# Patient Record
Sex: FEMALE | URBAN - METROPOLITAN AREA
[De-identification: names, ages, dates, MRNs, and addresses within clinical notes are randomized per-mention and may not be internally consistent; named-entity substitution may affect disease eponyms.]

---

## 2018-12-17 ENCOUNTER — APPOINTMENT (RX ONLY)
Dept: URBAN - METROPOLITAN AREA CLINIC 361 | Facility: CLINIC | Age: 78
Setting detail: DERMATOLOGY
End: 2018-12-17

## 2018-12-17 DIAGNOSIS — Z71.89 OTHER SPECIFIED COUNSELING: ICD-10-CM

## 2018-12-17 DIAGNOSIS — Z87.2 PERSONAL HISTORY OF DISEASES OF THE SKIN AND SUBCUTANEOUS TISSUE: ICD-10-CM

## 2018-12-17 DIAGNOSIS — L57.8 OTHER SKIN CHANGES DUE TO CHRONIC EXPOSURE TO NONIONIZING RADIATION: ICD-10-CM

## 2018-12-17 DIAGNOSIS — D18.0 HEMANGIOMA: ICD-10-CM

## 2018-12-17 DIAGNOSIS — L82.1 OTHER SEBORRHEIC KERATOSIS: ICD-10-CM

## 2018-12-17 DIAGNOSIS — Z85.828 PERSONAL HISTORY OF OTHER MALIGNANT NEOPLASM OF SKIN: ICD-10-CM

## 2018-12-17 PROBLEM — L57.0 ACTINIC KERATOSIS: Status: ACTIVE | Noted: 2018-12-17

## 2018-12-17 PROBLEM — D18.01 HEMANGIOMA OF SKIN AND SUBCUTANEOUS TISSUE: Status: ACTIVE | Noted: 2018-12-17

## 2018-12-17 PROBLEM — I10 ESSENTIAL (PRIMARY) HYPERTENSION: Status: ACTIVE | Noted: 2018-12-17

## 2018-12-17 PROBLEM — L85.3 XEROSIS CUTIS: Status: ACTIVE | Noted: 2018-12-17

## 2018-12-17 PROBLEM — M12.9 ARTHROPATHY, UNSPECIFIED: Status: ACTIVE | Noted: 2018-12-17

## 2018-12-17 PROBLEM — J30.1 ALLERGIC RHINITIS DUE TO POLLEN: Status: ACTIVE | Noted: 2018-12-17

## 2018-12-17 PROCEDURE — ? OTHER

## 2018-12-17 PROCEDURE — ? COUNSELING

## 2018-12-17 PROCEDURE — ? DEFER

## 2018-12-17 PROCEDURE — 99203 OFFICE O/P NEW LOW 30 MIN: CPT

## 2018-12-17 ASSESSMENT — LOCATION DETAILED DESCRIPTION DERM
LOCATION DETAILED: RIGHT RIB CAGE
LOCATION DETAILED: RIGHT DORSAL FOOT
LOCATION DETAILED: LEFT DISTAL PRETIBIAL REGION
LOCATION DETAILED: RIGHT PROXIMAL POSTERIOR UPPER ARM
LOCATION DETAILED: RIGHT INFERIOR MEDIAL UPPER BACK
LOCATION DETAILED: RIGHT DISTAL PRETIBIAL REGION
LOCATION DETAILED: RIGHT LATERAL EYEBROW
LOCATION DETAILED: RIGHT INFERIOR CENTRAL MALAR CHEEK
LOCATION DETAILED: SUPERIOR THORACIC SPINE
LOCATION DETAILED: LEFT DORSAL FOOT
LOCATION DETAILED: LEFT CENTRAL ZYGOMA
LOCATION DETAILED: EPIGASTRIC SKIN
LOCATION DETAILED: UPPER STERNUM
LOCATION DETAILED: LEFT PROXIMAL POSTERIOR UPPER ARM

## 2018-12-17 ASSESSMENT — LOCATION SIMPLE DESCRIPTION DERM
LOCATION SIMPLE: UPPER BACK
LOCATION SIMPLE: ABDOMEN
LOCATION SIMPLE: RIGHT EYEBROW
LOCATION SIMPLE: CHEST
LOCATION SIMPLE: LEFT PRETIBIAL REGION
LOCATION SIMPLE: RIGHT PRETIBIAL REGION
LOCATION SIMPLE: RIGHT POSTERIOR UPPER ARM
LOCATION SIMPLE: RIGHT CHEEK
LOCATION SIMPLE: RIGHT UPPER BACK
LOCATION SIMPLE: RIGHT FOOT
LOCATION SIMPLE: LEFT ZYGOMA
LOCATION SIMPLE: LEFT FOOT
LOCATION SIMPLE: LEFT POSTERIOR UPPER ARM

## 2018-12-17 ASSESSMENT — LOCATION ZONE DERM
LOCATION ZONE: LEG
LOCATION ZONE: FEET
LOCATION ZONE: ARM
LOCATION ZONE: FACE
LOCATION ZONE: TRUNK

## 2018-12-17 NOTE — PROCEDURE: DEFER
Procedure To Be Performed At Next Visit: Cryotherapy (Cosmetic)
Detail Level: Simple
Scheduling Instructions (Optional): pt will schedule after holidays

## 2020-07-14 ENCOUNTER — HOSPITAL ENCOUNTER (OUTPATIENT)
Dept: HOSPITAL 8 - CFH | Age: 80
Discharge: HOME | End: 2020-07-14
Attending: INTERNAL MEDICINE
Payer: MEDICARE

## 2020-07-14 DIAGNOSIS — I44.7: ICD-10-CM

## 2020-07-14 DIAGNOSIS — I08.2: Primary | ICD-10-CM

## 2020-07-14 PROCEDURE — 93017 CV STRESS TEST TRACING ONLY: CPT

## 2020-07-14 PROCEDURE — 93306 TTE W/DOPPLER COMPLETE: CPT

## 2020-07-14 PROCEDURE — A9502 TC99M TETROFOSMIN: HCPCS

## 2020-07-14 PROCEDURE — 78452 HT MUSCLE IMAGE SPECT MULT: CPT

## 2021-07-06 PROBLEM — M75.100 ROTATOR CUFF TEAR: Status: ACTIVE | Noted: 2021-07-06

## 2022-03-04 ENCOUNTER — PRE-ADMISSION TESTING (OUTPATIENT)
Dept: ADMISSIONS | Facility: MEDICAL CENTER | Age: 82
End: 2022-03-04
Attending: ORTHOPAEDIC SURGERY
Payer: MEDICARE

## 2022-03-04 DIAGNOSIS — Z01.812 PRE-OPERATIVE LABORATORY EXAMINATION: ICD-10-CM

## 2022-03-04 DIAGNOSIS — Z01.810 PRE-OPERATIVE CARDIOVASCULAR EXAMINATION: ICD-10-CM

## 2022-03-04 LAB
ANION GAP SERPL CALC-SCNC: 10 MMOL/L (ref 7–16)
BUN SERPL-MCNC: 19 MG/DL (ref 8–22)
CALCIUM SERPL-MCNC: 9.7 MG/DL (ref 8.4–10.2)
CHLORIDE SERPL-SCNC: 102 MMOL/L (ref 96–112)
CO2 SERPL-SCNC: 25 MMOL/L (ref 20–33)
CREAT SERPL-MCNC: 0.91 MG/DL (ref 0.5–1.4)
EKG IMPRESSION: NORMAL
GLUCOSE SERPL-MCNC: 101 MG/DL (ref 65–99)
POTASSIUM SERPL-SCNC: 4.2 MMOL/L (ref 3.6–5.5)
SODIUM SERPL-SCNC: 137 MMOL/L (ref 135–145)

## 2022-03-04 PROCEDURE — C9803 HOPD COVID-19 SPEC COLLECT: HCPCS

## 2022-03-04 PROCEDURE — U0005 INFEC AGEN DETEC AMPLI PROBE: HCPCS

## 2022-03-04 PROCEDURE — 36415 COLL VENOUS BLD VENIPUNCTURE: CPT

## 2022-03-04 PROCEDURE — 93005 ELECTROCARDIOGRAM TRACING: CPT

## 2022-03-04 PROCEDURE — 93010 ELECTROCARDIOGRAM REPORT: CPT | Performed by: INTERNAL MEDICINE

## 2022-03-04 PROCEDURE — 80048 BASIC METABOLIC PNL TOTAL CA: CPT

## 2022-03-04 PROCEDURE — U0003 INFECTIOUS AGENT DETECTION BY NUCLEIC ACID (DNA OR RNA); SEVERE ACUTE RESPIRATORY SYNDROME CORONAVIRUS 2 (SARS-COV-2) (CORONAVIRUS DISEASE [COVID-19]), AMPLIFIED PROBE TECHNIQUE, MAKING USE OF HIGH THROUGHPUT TECHNOLOGIES AS DESCRIBED BY CMS-2020-01-R: HCPCS

## 2022-03-04 RX ORDER — COVID-19 ANTIGEN TEST
KIT MISCELLANEOUS
COMMUNITY
End: 2022-05-02

## 2022-03-04 NOTE — PREPROCEDURE INSTRUCTIONS
"Preadmit appointment: \" Preparing for your Procedure information\" sheet given to patient with verbal and written instructions. Patient instructed to continue prescribed medications through the day before surgery, instructed to take the following medications the day of surgery per anesthesia protocol:  Prevacid, Sotalol  Pt states, \"no issues with anesthesia\".  Fasting guidelines handout given to Pt with Dr. Bains's NPO 8 hours before surgery, Pt is encouraged to have water or Gatorade, coffee or tea, NO DAIRY up till 4 hours before scheduled surgery.  All Pt's questions and concerns answered.  "

## 2022-03-05 LAB
SARS-COV-2 RNA RESP QL NAA+PROBE: NOTDETECTED
SPECIMEN SOURCE: NORMAL

## 2022-03-05 NOTE — PREPROCEDURE INSTRUCTIONS
Email to Dr De Anda:    Good afternoon Dr De Anda, Pt Joseluis MRN [id number] is having a Shoulder scope with Dr Bains on 3/11.  Hx:  A-Fib, LBBB, HTN,  she saw her cardiologist on 1/17/2022, Dr Rene and he cleared her for surgery, there is a note in “E” reconciliation from [facility].  She is taking ASA 81, Sotalol and Lisinopril from a cardiac standpoint.   Otherwise a fast moving 81 year old!  Anything further?    Stu De Anda reviewed medical records and indicates:    Does the cardiology note address the patient’s LBBB?  If so, nothing further from my standpoint.  Thank you.   Chadwick De Anda M.D.  [facility]    Re-Checked notes:  Yes his notes refer to the LBBB as nothing new  Stu ALEXIS

## 2022-03-11 ENCOUNTER — ANESTHESIA EVENT (OUTPATIENT)
Dept: SURGERY | Facility: MEDICAL CENTER | Age: 82
End: 2022-03-11
Payer: MEDICARE

## 2022-03-11 ENCOUNTER — HOSPITAL ENCOUNTER (OUTPATIENT)
Facility: MEDICAL CENTER | Age: 82
End: 2022-03-11
Attending: ORTHOPAEDIC SURGERY | Admitting: ORTHOPAEDIC SURGERY
Payer: MEDICARE

## 2022-03-11 ENCOUNTER — ANESTHESIA (OUTPATIENT)
Dept: SURGERY | Facility: MEDICAL CENTER | Age: 82
End: 2022-03-11
Payer: MEDICARE

## 2022-03-11 VITALS
OXYGEN SATURATION: 90 % | WEIGHT: 166.01 LBS | TEMPERATURE: 97 F | BODY MASS INDEX: 28.34 KG/M2 | RESPIRATION RATE: 16 BRPM | HEART RATE: 62 BPM | DIASTOLIC BLOOD PRESSURE: 88 MMHG | HEIGHT: 64 IN | SYSTOLIC BLOOD PRESSURE: 149 MMHG

## 2022-03-11 PROCEDURE — C1713 ANCHOR/SCREW BN/BN,TIS/BN: HCPCS | Performed by: ORTHOPAEDIC SURGERY

## 2022-03-11 PROCEDURE — 700101 HCHG RX REV CODE 250: Performed by: ANESTHESIOLOGY

## 2022-03-11 PROCEDURE — 160048 HCHG OR STATISTICAL LEVEL 1-5: Performed by: ORTHOPAEDIC SURGERY

## 2022-03-11 PROCEDURE — 501838 HCHG SUTURE GENERAL: Performed by: ORTHOPAEDIC SURGERY

## 2022-03-11 PROCEDURE — 700105 HCHG RX REV CODE 258: Performed by: ORTHOPAEDIC SURGERY

## 2022-03-11 PROCEDURE — 160046 HCHG PACU - 1ST 60 MINS PHASE II: Performed by: ORTHOPAEDIC SURGERY

## 2022-03-11 PROCEDURE — 64415 NJX AA&/STRD BRCH PLXS IMG: CPT | Performed by: ORTHOPAEDIC SURGERY

## 2022-03-11 PROCEDURE — 700111 HCHG RX REV CODE 636 W/ 250 OVERRIDE (IP): Performed by: ORTHOPAEDIC SURGERY

## 2022-03-11 PROCEDURE — 160029 HCHG SURGERY MINUTES - 1ST 30 MINS LEVEL 4: Performed by: ORTHOPAEDIC SURGERY

## 2022-03-11 PROCEDURE — 160035 HCHG PACU - 1ST 60 MINS PHASE I: Performed by: ORTHOPAEDIC SURGERY

## 2022-03-11 PROCEDURE — 160025 RECOVERY II MINUTES (STATS): Performed by: ORTHOPAEDIC SURGERY

## 2022-03-11 PROCEDURE — 160002 HCHG RECOVERY MINUTES (STAT): Performed by: ORTHOPAEDIC SURGERY

## 2022-03-11 PROCEDURE — C1889 IMPLANT/INSERT DEVICE, NOC: HCPCS | Performed by: ORTHOPAEDIC SURGERY

## 2022-03-11 PROCEDURE — 502000 HCHG MISC OR IMPLANTS RC 0278: Performed by: ORTHOPAEDIC SURGERY

## 2022-03-11 PROCEDURE — 160041 HCHG SURGERY MINUTES - EA ADDL 1 MIN LEVEL 4: Performed by: ORTHOPAEDIC SURGERY

## 2022-03-11 PROCEDURE — 160009 HCHG ANES TIME/MIN: Performed by: ORTHOPAEDIC SURGERY

## 2022-03-11 PROCEDURE — 700105 HCHG RX REV CODE 258: Performed by: ANESTHESIOLOGY

## 2022-03-11 PROCEDURE — 700111 HCHG RX REV CODE 636 W/ 250 OVERRIDE (IP): Performed by: ANESTHESIOLOGY

## 2022-03-11 DEVICE — IMPLANTABLE DEVICE: Type: IMPLANTABLE DEVICE | Status: FUNCTIONAL

## 2022-03-11 RX ORDER — ONDANSETRON 2 MG/ML
INJECTION INTRAMUSCULAR; INTRAVENOUS PRN
Status: DISCONTINUED | OUTPATIENT
Start: 2022-03-11 | End: 2022-03-11 | Stop reason: SURG

## 2022-03-11 RX ORDER — EPINEPHRINE 1 MG/ML(1)
AMPUL (ML) INJECTION
Status: DISCONTINUED | OUTPATIENT
Start: 2022-03-11 | End: 2022-03-11 | Stop reason: HOSPADM

## 2022-03-11 RX ORDER — MEPERIDINE HYDROCHLORIDE 25 MG/ML
12.5 INJECTION INTRAMUSCULAR; INTRAVENOUS; SUBCUTANEOUS
Status: DISCONTINUED | OUTPATIENT
Start: 2022-03-11 | End: 2022-03-11 | Stop reason: HOSPADM

## 2022-03-11 RX ORDER — SODIUM CHLORIDE, SODIUM LACTATE, POTASSIUM CHLORIDE, CALCIUM CHLORIDE 600; 310; 30; 20 MG/100ML; MG/100ML; MG/100ML; MG/100ML
INJECTION, SOLUTION INTRAVENOUS CONTINUOUS
Status: ACTIVE | OUTPATIENT
Start: 2022-03-11 | End: 2022-03-11

## 2022-03-11 RX ORDER — SODIUM CHLORIDE, SODIUM LACTATE, POTASSIUM CHLORIDE, CALCIUM CHLORIDE 600; 310; 30; 20 MG/100ML; MG/100ML; MG/100ML; MG/100ML
INJECTION, SOLUTION INTRAVENOUS CONTINUOUS
Status: DISCONTINUED | OUTPATIENT
Start: 2022-03-11 | End: 2022-03-11 | Stop reason: HOSPADM

## 2022-03-11 RX ORDER — ONDANSETRON 2 MG/ML
4 INJECTION INTRAMUSCULAR; INTRAVENOUS
Status: DISCONTINUED | OUTPATIENT
Start: 2022-03-11 | End: 2022-03-11 | Stop reason: HOSPADM

## 2022-03-11 RX ORDER — OXYCODONE HCL 5 MG/5 ML
10 SOLUTION, ORAL ORAL
Status: DISCONTINUED | OUTPATIENT
Start: 2022-03-11 | End: 2022-03-11 | Stop reason: HOSPADM

## 2022-03-11 RX ORDER — SODIUM CHLORIDE, SODIUM LACTATE, POTASSIUM CHLORIDE, CALCIUM CHLORIDE 600; 310; 30; 20 MG/100ML; MG/100ML; MG/100ML; MG/100ML
INJECTION, SOLUTION INTRAVENOUS CONTINUOUS
Status: DISCONTINUED | OUTPATIENT
Start: 2022-03-11 | End: 2022-03-11

## 2022-03-11 RX ORDER — CEFAZOLIN SODIUM 1 G/3ML
INJECTION, POWDER, FOR SOLUTION INTRAMUSCULAR; INTRAVENOUS PRN
Status: DISCONTINUED | OUTPATIENT
Start: 2022-03-11 | End: 2022-03-11 | Stop reason: SURG

## 2022-03-11 RX ORDER — HYDROMORPHONE HYDROCHLORIDE 1 MG/ML
0.1 INJECTION, SOLUTION INTRAMUSCULAR; INTRAVENOUS; SUBCUTANEOUS
Status: DISCONTINUED | OUTPATIENT
Start: 2022-03-11 | End: 2022-03-11 | Stop reason: HOSPADM

## 2022-03-11 RX ORDER — DEXAMETHASONE SODIUM PHOSPHATE 4 MG/ML
INJECTION, SOLUTION INTRA-ARTICULAR; INTRALESIONAL; INTRAMUSCULAR; INTRAVENOUS; SOFT TISSUE PRN
Status: DISCONTINUED | OUTPATIENT
Start: 2022-03-11 | End: 2022-03-11 | Stop reason: SURG

## 2022-03-11 RX ORDER — ROPIVACAINE HYDROCHLORIDE 5 MG/ML
INJECTION, SOLUTION EPIDURAL; INFILTRATION; PERINEURAL PRN
Status: DISCONTINUED | OUTPATIENT
Start: 2022-03-11 | End: 2022-03-11 | Stop reason: SURG

## 2022-03-11 RX ORDER — OXYCODONE HCL 5 MG/5 ML
5 SOLUTION, ORAL ORAL
Status: DISCONTINUED | OUTPATIENT
Start: 2022-03-11 | End: 2022-03-11 | Stop reason: HOSPADM

## 2022-03-11 RX ORDER — SODIUM CHLORIDE, SODIUM LACTATE, POTASSIUM CHLORIDE, CALCIUM CHLORIDE 600; 310; 30; 20 MG/100ML; MG/100ML; MG/100ML; MG/100ML
INJECTION, SOLUTION INTRAVENOUS
Status: DISCONTINUED | OUTPATIENT
Start: 2022-03-11 | End: 2022-03-11 | Stop reason: SURG

## 2022-03-11 RX ORDER — LIDOCAINE HYDROCHLORIDE 20 MG/ML
INJECTION, SOLUTION EPIDURAL; INFILTRATION; INTRACAUDAL; PERINEURAL PRN
Status: DISCONTINUED | OUTPATIENT
Start: 2022-03-11 | End: 2022-03-11 | Stop reason: SURG

## 2022-03-11 RX ORDER — HYDROMORPHONE HYDROCHLORIDE 1 MG/ML
0.4 INJECTION, SOLUTION INTRAMUSCULAR; INTRAVENOUS; SUBCUTANEOUS
Status: DISCONTINUED | OUTPATIENT
Start: 2022-03-11 | End: 2022-03-11 | Stop reason: HOSPADM

## 2022-03-11 RX ORDER — HYDROMORPHONE HYDROCHLORIDE 1 MG/ML
0.2 INJECTION, SOLUTION INTRAMUSCULAR; INTRAVENOUS; SUBCUTANEOUS
Status: DISCONTINUED | OUTPATIENT
Start: 2022-03-11 | End: 2022-03-11 | Stop reason: HOSPADM

## 2022-03-11 RX ORDER — HALOPERIDOL 5 MG/ML
1 INJECTION INTRAMUSCULAR
Status: DISCONTINUED | OUTPATIENT
Start: 2022-03-11 | End: 2022-03-11 | Stop reason: HOSPADM

## 2022-03-11 RX ADMIN — CEFAZOLIN 2 G: 330 INJECTION, POWDER, FOR SOLUTION INTRAMUSCULAR; INTRAVENOUS at 07:07

## 2022-03-11 RX ADMIN — DEXAMETHASONE SODIUM PHOSPHATE 4 MG: 4 INJECTION, SOLUTION INTRAMUSCULAR; INTRAVENOUS at 07:07

## 2022-03-11 RX ADMIN — LIDOCAINE HYDROCHLORIDE 100 MG: 20 INJECTION, SOLUTION EPIDURAL; INFILTRATION; INTRACAUDAL; PERINEURAL at 07:07

## 2022-03-11 RX ADMIN — SODIUM CHLORIDE, POTASSIUM CHLORIDE, SODIUM LACTATE AND CALCIUM CHLORIDE: 600; 310; 30; 20 INJECTION, SOLUTION INTRAVENOUS at 07:07

## 2022-03-11 RX ADMIN — PROPOFOL 200 MG: 10 INJECTION, EMULSION INTRAVENOUS at 07:07

## 2022-03-11 RX ADMIN — SODIUM CHLORIDE, POTASSIUM CHLORIDE, SODIUM LACTATE AND CALCIUM CHLORIDE: 600; 310; 30; 20 INJECTION, SOLUTION INTRAVENOUS at 06:06

## 2022-03-11 RX ADMIN — EPHEDRINE SULFATE 10 MG: 50 INJECTION INTRAMUSCULAR; INTRAVENOUS; SUBCUTANEOUS at 07:17

## 2022-03-11 RX ADMIN — ROPIVACAINE HYDROCHLORIDE 20 ML: 5 INJECTION, SOLUTION EPIDURAL; INFILTRATION; PERINEURAL at 06:48

## 2022-03-11 RX ADMIN — ONDANSETRON 4 MG: 2 INJECTION INTRAMUSCULAR; INTRAVENOUS at 07:07

## 2022-03-11 NOTE — ANESTHESIA TIME REPORT
Anesthesia Start and Stop Event Times     Date Time Event    3/11/2022 0641 Ready for Procedure     0700 Anesthesia Start     0828 Anesthesia Stop        Responsible Staff  03/11/22    Name Role Begin End    Tobey Gansert, M.D. Anesth 0700 0828        Preop Diagnosis (Free Text):  Pre-op Diagnosis     IMPINGEMENT SYNDROME OF RIGHT SHOULDER        Preop Diagnosis (Codes):    Premium Reason  Non-Premium    Comments:

## 2022-03-11 NOTE — DISCHARGE INSTRUCTIONS
ACTIVITY: Rest and take it easy for the first 24 hours.  A responsible adult is recommended to remain with you during that time.  It is normal to feel sleepy.  We encourage you to not do anything that requires balance, judgment or coordination.    MILD FLU-LIKE SYMPTOMS ARE NORMAL. YOU MAY EXPERIENCE GENERALIZED MUSCLE ACHES, THROAT IRRITATION, HEADACHE AND/OR SOME NAUSEA.    FOR 24 HOURS DO NOT:  Drive, operate machinery or run household appliances.  Drink beer or alcoholic beverages.   Make important decisions or sign legal documents.    SPECIAL INSTRUCTIONS:   Post-Operative Instructions - Shoulder Arthroscopy     Dressing and wound care: Keep your shoulder dressing clean and dry after surgery.  Be aware that some leaking of blood or fluid from your dressing can occur and is normal. You may remove your dressing 3 days after the operation.  Notice that you have a single incision and/or several small incisions that have been closed with sutures. Cover each of these incisions with a light dressing or band-aids.  This keeps the surgical incisions clean, as well as preventing your clothes from spotting with blood or fluid.  Change band-aids or light dressing daily.     Shower / bathing: Keep the shoulder dry for 3 days after your surgery.  Then, you may shower. You may let soap and water run over skin incisions, but do not immerse your shoulder in water.  No swimming pools, hot tubs, or baths are recommended until at least 6 weeks after surgery.     Ice: Apply an ice pack to your shoulder (15 minutes on the shoulder, 15 minutes off the shoulder), as you feel necessary to help with the pain and swelling.   If you have a cold therapy, use liberally as directed.     Sling / Shoulder Immobilizer: The sling should be on except when bathing or doing your exercises.  You may also carefully remove the sling when awake and seated.  Be sure to support your forearm with a pillow so your shoulder remains in a relaxed and  comfortable position.  Please wear the sling while sleeping.     Activity: It is important to move your shoulder, as well as your elbow, wrist, and hand several times daily, starting the day after surgery.  You may do pendulum exercises with your operative arm starting the day after surgery.  Pendulum (dangling [place]) exercises are encouraged 3-5 times daily.  The sling will need to be removed for pendulum exercises.  You may loosen the sling to use your operative arm for light simple activities such as eating, brushing teeth, or writing/typing.  Nothing heavier than a cup of coffee in your hand on the operative side until otherwise recommended.     Pain medication: Take your pain medicine, as needed and prescribed.  Do not drive or operate machinery while taking narcotic pain medication.   You may start or resume anti-inflammatory medication (i.e. ibuprofen, naproxen) anytime after surgery, which should be taken with food to avoid stomach irritation.     Driving: You may drive as soon as you are off narcotic pain medication and feel comfortable behind the wheel.  Loosen or remove your sling, supporting your forearm on a pillow when you drive.  It is important for both hands to have access to the steering wheel for safety.     Aspirin: Take one low dose 81 mg aspirin starting the morning after surgery twice daily - one in the morning and one in the evening - for two weeks following surgery.     Problems: If you are having problems with your shoulder (unexpected pain, excessive bleeding or discharge from your incisions, fevers/chills) do not hesitate to call the office or visit the nearest emergency room for evaluation.     Follow-up: Make sure that you have an appointment 7-14 days following surgery.  Your stitches will be removed, and your procedure/rehabilitation will be discussed at that time.   Physical therapy may be prescribed at that time.     Annette Bains MD [facility]  [phone]      DIET: To avoid nausea, slowly advance diet as tolerated, avoiding spicy or greasy foods for the first day.  Add more substantial food to your diet according to your physician's instructions.   INCREASE FLUIDS AND FIBER TO AVOID CONSTIPATION.      FOLLOW-UP APPOINTMENT:  A follow-up appointment should be arranged with your doctor in office as instructed; call to schedule.    You should CALL YOUR PHYSICIAN if you develop:  Fever greater than 101 degrees F.  Pain not relieved by medication, or persistent nausea or vomiting.  Excessive bleeding (blood soaking through dressing) or unexpected drainage from the wound.  Extreme redness or swelling around the incision site, drainage of pus or foul smelling drainage.  Inability to urinate or empty your bladder within 8 hours.  Problems with breathing or chest pain.    You should call 911 if you develop problems with breathing or chest pain.  If you are unable to contact your doctor or surgical center, you should go to the nearest emergency room or urgent care center.  Physician's telephone #:   Dr Bains [phone]    If any questions arise, call your doctor.  If your doctor is not available, please feel free to call the Surgical Center at [phone].     A registered nurse may call you a few days after your surgery to see how you are doing after your procedure.    MEDICATIONS: Resume taking daily medication.  Take prescribed pain medication with food.  If no medication is prescribed, you may take non-aspirin pain medication if needed.  PAIN MEDICATION CAN BE VERY CONSTIPATING.  Take a stool softener or laxative such as senokot, pericolace, or milk of magnesia if needed.    No oral pain medication given in Recovery    If your physician has prescribed pain medication that includes Acetaminophen (Tylenol), do not take additional Acetaminophen (Tylenol) while taking the prescribed medication.    Depression / Suicide Risk    As you are discharged from  this [facility] facility, it is important to learn how to keep safe from harming yourself.    Recognize the warning signs:  · Abrupt changes in personality, positive or negative- including increase in energy   · Giving away possessions  · Change in eating patterns- significant weight changes-  positive or negative  · Change in sleeping patterns- unable to sleep or sleeping all the time   · Unwillingness or inability to communicate  · Depression  · Unusual sadness, discouragement and loneliness  · Talk of wanting to die  · Neglect of personal appearance   · Rebelliousness- reckless behavior  · Withdrawal from people/activities they love  · Confusion- inability to concentrate     If you or a loved one observes any of these behaviors or has concerns about self-harm, here's what you can do:  · Talk about it- your feelings and reasons for harming yourself  · Remove any means that you might use to hurt yourself (examples: pills, rope, extension cords, firearm)  · Get professional help from the community (Mental Health, Substance Abuse, psychological counseling)  · Do not be alone:Call your Safe Contact- someone whom you trust who will be there for you.  · Call your local CRISIS HOTLINE 822-7679 or 585-352-3026  · Call your local Children's Mobile Crisis Response Team [place] (597) 573-4289 or www.[employer]  · Call the toll free National Suicide Prevention Hotlines   · National Suicide Prevention Lifeline 052-024-XMKY (2272)  · National Hope Line Network 800-SUICIDE (882-8375)          Peripheral Nerve Block Discharge Instructions from Same Day Surgery and Inpatient :    What to Expect - Upper Extremity  · You may experience numbness and weakness in shoulder, arm and hand  on the same side as your surgery  · This is normal. For some people, this may be an unpleasant sensation. Be very careful with your numb limb  · Ask for help when you need it  Shoulder Surgery Side Effects  · In addition to numbness and  "weakness you may experience other symptoms  · Other nerves that are close to those nerves injected can also be affected by local anesthesia  · You may experience a hoarseness in your voice  · Your breathing may feel different  · You may also notice drooping of your eyelid, pupil constriction, and decreased sweating, on the side of your surgery  · All of these side effects are normal and will resolve when the local anesthetic wears off   Prevent Injury  · Protect the limb like a baby  · Beware of exposing your limb to extreme heat or cold or trauma  · The limb may be injured without you noticing because it is numb  · Keep the limb elevated whenever possible  · Do not sleep on the limb  · Change the position of the limb regularly  · Avoid putting pressure on your surgical limb  Pain Control  · The initial block on the day of surgery will make your extremity feel \"numb\"  · Any consecutive injection including prior to discharge from the hospital will make your extremity feel \"numb\"  · You may feel an aching or burning when the local anesthesia starts to wear off  · Take pain pills as prescribed by your surgeon  · Call your surgeon or anesthesiologist if you do not have adequate pain control        How To Use an Incentive Spirometer  An incentive spirometer is a tool that measures how well you are filling your lungs with each breath. Learning to take long, deep breaths using this tool can help you keep your lungs clear and active. This may help to reverse or lessen your chance of developing breathing (pulmonary) problems, especially infection. You may be asked to use a spirometer:  · After a surgery.  · If you have a lung problem or a history of smoking.  · After a long period of time when you have been unable to move or be active.  If the spirometer includes an indicator to show the highest number that you have reached, your health care provider or respiratory therapist will help you set a goal. Keep a list (log) of " your progress as told by your health care provider.  What are the risks?  · Breathing too quickly may cause dizziness or cause you to pass out. Take your time so you do not get dizzy or light-headed.  · If you are in pain, you may need to take pain medicine before doing incentive spirometry. It is harder to take a deep breath if you are having pain.  How to use your incentive spirometer    1. Sit up on the edge of your bed or on a chair.  2. Hold the incentive spirometer so that it is in an upright position.  3. Before you use the spirometer, breathe out normally.  4. Place the mouthpiece in your mouth. Make sure your lips are closed tightly around it.  5. Breathe in slowly and as deeply as you can through your mouth, causing the piston or the ball to rise toward the top of the chamber.  6. Hold your breath for 3-5 seconds, or for as long as possible.  ? If the spirometer includes a  indicator, use this to guide you in breathing. Slow down your breathing if the indicator goes above the marked areas.  7. Remove the mouthpiece from your mouth and breathe out normally. The piston or ball will return to the bottom of the chamber.  8. Rest for a few seconds, then repeat the steps 10 or more times.  ? Take your time and take a few normal breaths between deep breaths so that you do not get dizzy or light-headed.  ? Do this every 1-2 hours when you are awake.  9. If the spirometer includes a goal marker to show the highest number you have reached (best effort), use this as a goal to work toward during each repetition.  10. After each set of 10 deep breaths, cough a few times. This will help to make sure that your lungs are clear.  ? If you have an incision on your chest or abdomen from surgery, place a pillow or a rolled-up towel firmly against the incision when you cough. This can help to reduce pain from coughing.  General tips  · When you become able to get out of bed, walk around often and continue to cough to help  clear your lungs.  · Keep using the incentive spirometer until your health care provider says it is okay to stop using it. If you have been in the hospital, you may be told to keep using the spirometer at home.  Contact a health care provider if:  · You are having difficulty using the spirometer.  · You have trouble using the spirometer as often as instructed.  · Your pain medicine is not giving enough relief for you to use the spirometer as told.  · You have a fever.  · You develop shortness of breath.  Get help right away if:  · You develop a cough with bloody mucus from the lungs (bloody sputum).  · You have fluid or blood coming from an incision site after you cough.  Summary  · An incentive spirometer is a tool that can help you learn to take long, deep breaths to keep your lungs clear and active.  · You may be asked to use a spirometer after a surgery, if you have a lung problem or a history of smoking, or if you have been inactive for a long period of time.  · Use your incentive spirometer as instructed every 1-2 hours while you are awake.  · If you have an incision on your chest or abdomen, place a pillow or a rolled-up towel firmly against your incision when you cough. This will help to reduce pain.  This information is not intended to replace advice given to you by your health care provider. Make sure you discuss any questions you have with your health care provider.  Document Released: 04/29/2008 Document Revised: 01/10/2019 Document Reviewed: 10/31/2018  Elsevier Patient Education © 2020 [employer]

## 2022-03-11 NOTE — ANESTHESIA PROCEDURE NOTES
Peripheral Block    Date/Time: 3/11/2022 6:45 AM  Performed by: Tobey Gansert, M.D.  Authorized by: Tobey Gansert, M.D.     Start Time:  3/11/2022 6:45 AM  End Time:  3/11/2022 6:50 AM  Reason for Block: at surgeon's request and post-op pain management ONLY    patient identified, IV checked, site marked, risks and benefits discussed, surgical consent, monitors and equipment checked, pre-op evaluation and timeout performed    Patient Position:  Supine  Prep: ChloraPrep    Monitoring:  Heart rate, continuous pulse ox and cardiac monitor  Block Region:  Upper Extremity  Upper Extremity - Block Type:  BRACHIAL PLEXUS block, Interscalene approach    Laterality:  Right  Procedures: ultrasound guided  Image captured, interpreted and electronically stored.  Local Infiltration:  Lidocaine  Strength:  1 %  Dose:  3 ml  Block Type:  Single-shot  Needle Length:  50mm  Needle Gauge:  22 G  Needle Localization:  Ultrasound guidance  Injection Assessment:  Negative aspiration for heme, no paresthesia on injection, incremental injection and local visualized surrounding nerve on ultrasound  Evidence of intravascular injection: No     US Guided Interscalene Brachial Plexus Block   US transducer placed on the neck in oblique plane approximately at the level of C6.  Anterior and Middle Scalene (MSM) muscles identified with nerve trunks identified between the muscles.  Needle inserted lateral to probe and advanced under direct visualization through the MSM into a perineural position.  After negative aspiration, LA injected with ease and visualized surrounding the nerve trunks.

## 2022-03-11 NOTE — OR SURGEON
Immediate Post OP Note    PreOp Diagnosis: RIGHT shoulder impingement, RCT      PostOp Diagnosis: SAME with LHB tendon tear      Procedure(s):  RIGHT  DECOMPRESSION, SHOULDER, ARTHROSCOPIC - SUBACROMIAL - Wound Class: Clean  ARTHROSCOPIC LABRAL DEBRIDEMENT - Wound Class: Clean  ARTHROSCOPY, SHOULDER, WITH ROTATOR CUFF REPAIR - Wound Class: Clean  ARTHROPLASTY,BALLOON - Wound Class: Clean    Surgeon(s):  Annette Bains M.D.    Anesthesiologist/Type of Anesthesia:  Anesthesiologist: Tobey Gansert, M.D./General    Surgical Staff:  Circulator: Nikole Romo R.N.  Scrub Person: Haleihg Santillan  First Assist: Gagandeep Castillo [email or web address]    Specimens removed if any:  * No specimens in log *    Estimated Blood Loss: min    Findings: as above    Complications: none    [place]        3/11/2022 8:33 AM Annette Bains M.D.

## 2022-03-11 NOTE — ANESTHESIA POSTPROCEDURE EVALUATION
Patient: Elvi Huggins    Procedure Summary     Date: 03/11/22 Room / Location:  OR  / SURGERY [facility]    Anesthesia Start: 0700 Anesthesia Stop: 0828    Procedures:       DECOMPRESSION, SHOULDER, ARTHROSCOPIC - SUBACROMIAL (Right Shoulder)      ARTHROSCOPIC LABRAL DEBRIDEMENT (Right Shoulder)      ARTHROSCOPY, SHOULDER, WITH ROTATOR CUFF REPAIR (Right Shoulder)      ARTHROPLASTY,BALLOON (Right Shoulder) Diagnosis: (IMPINGEMENT SYNDROME OF RIGHT SHOULDER)    Surgeons: Annette Bains M.D. Responsible Provider: Tobey Gansert, M.D.    Anesthesia Type: general ASA Status: 2          Final Anesthesia Type: general  Last vitals  BP   Blood Pressure : 149/88    Temp   36.1 °C (97 °F)    Pulse   62   Resp   16    SpO2   90 %      Anesthesia Post Evaluation    Patient location during evaluation: PACU  Patient participation: complete - patient participated  Level of consciousness: awake and alert    Airway patency: patent  Anesthetic complications: no  Cardiovascular status: hemodynamically stable  Respiratory status: acceptable  Hydration status: euvolemic    PONV: none          No complications documented.     Nurse Pain Score: 0 (NPRS)

## 2022-03-11 NOTE — OP REPORT
DATE OF SERVICE:  03/11/2022     PREOPERATIVE DIAGNOSES:  Right shoulder impingement syndrome, rotator cuff   tear.     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     PROCEDURES:  Right shoulder arthroscopy, subacromial decompression, labral   debridement, arthroscopic rotator cuff repair including subscapularis tendon,   InSpace balloon arthroplasty.     SURGEON:  Annette Bains MD     ASSISTANT:  Gagandeep Castillo CFA     ANESTHESIOLOGIST:  Tobey Gansert, MD     TYPE OF ANESTHESIA:  General, with preoperative interscalene nerve block.     INTRAVENOUS FLUID:  1 liter crystalloid.     ESTIMATED BLOOD LOSS:  Minimal.     DRAINS:  None.     SPECIMENS:  None.     COMPLICATIONS:  None.     IMPLANTS:  [place] alpha vent anchors x2, ReelX anchor x1.     REASON FOR PROCEDURE:  The patient is an 81-year-old female with a   longstanding history of right shoulder pain.  She was diagnosed with a rotator   cuff tear on MRI.  She had essentially no glenohumeral joint arthritis.  We   decided to proceed with arthroscopy, with rotator cuff repair as well as   balloon arthroplasty due to her anticipated bone and tissue quality.     DESCRIPTION OF PROCEDURE:  The patient was given a right interscalene nerve   block by the anesthesiologist before surgery.  Once back in the operating   room, a breathing tube was placed.  She was given 2 grams of IV Ancef.  She   was then placed in the lateral decubitus position.  Care was taken to pad her   axilla as well as all bony prominences.  The right shoulder was then examined   under anesthesia.  She was noted to have good range of motion, without   instability.  The right upper extremity was then prepped with ChloraPrep and   draped in standard sterile fashion.  It was then placed in the Arthrex   traction device.  Bony landmarks were drawn and a standard  posterior portal   was established.  The arthroscope was then inserted.  An anterosuperior   cannula was placed in the rotator interval, just beneath the biceps tendon   region, although the biceps tendon was noted to be torn and retracted out of   the joint.  There were essentially no degenerative arthritic changes in the   glenohumeral joint.  There was a global labral fraying.  A labral debridement   took place.  There was an upper rolled edge subscapularis tendon tear.  The   decision was made to repair this, especially in light of the anticipated   balloon arthroplasty.  The lesser tuberosity was then abraded.  An 8 mm   cannula was placed.  An inverted horizontal mattress luggage tag suture was   placed around the subscapularis tendon, then the anchor was tapped and the   suture was wheeled in allowing for the subscapularis tendon to be repaired.    Next, the arthroscope was inserted into the subacromial space.  A lateral   portal was established.  There was a moderate degree of thickened, inflamed   bursal tissue.  A limited bursectomy was carried out because of the   anticipated balloon.  The undersurface of the acromion was then abraded and   the 5.5 mm resector was used to remove and smoothed off some bone on the   undersurface of the acromion.  The lateral aspect of the acromioplasty was   completed.  The coracoacromial ligament was left intact.  Next, the rotator   cuff was approached.  There was a mildly retracted rotator cuff tear, with   quite thin tissue.  This was, however repairable.  A 4 anchor repair was   planned.  This was done by placing two medial row Debbi alpha vent anchors.    The Cobra device was used to take anterior and posterior simple passes, with   the tapes placed in an alternating fashion.  The tails of the tape were then   placed into a posterolateral followed by an anterolateral ReelX anchor.  Both   of the ReelX anchors; however, pulled out due to very poor bone quality in  the   lateral tuberosity.  Thus, these 2 lateral row anchors were removed.  The   medial row anchors were left in place.  This left a small defect in the   central portion of the rotator cuff; however, the decision was made not to   place any further anchors in the tuberosity due to very soft bone, placing the   anchors at risk for future pullout.  At this point, the balloon arthroplasty   was performed.  A medium balloon was measured and placed.  Using the   recommended technique, it was over filled then the recommended fluid was   removed leaving the balloon in place.  The  handle was removed.  The   shoulder was then taken through a range of motion.  The balloon appeared   stable in the subacromial space.  All instruments were then removed.  Portals   were closed with 3-0 nylon suture.  Sterile dressing was applied.  All drapes   were removed and the arm was carefully taken out of traction and placed into a   shoulder abduction sling.  The patient was placed supine on a stretcher and   taken to recovery room, in stable condition.        ______________________________  MD NAN RODGERS/JEANINE    DD:  03/11/2022 08:41  DT:  03/11/2022 09:17    Job#:  [id number]

## 2022-03-11 NOTE — OR NURSING
0927: To PACU from OR. OPA in place with 6lpm oxygen via mask. Respirations spontaneous and unlabored. Surgical dressing CDI. Immobilizer in place. Ice pack applied.  Affected extremity capillary refill less than 3 seconds. Radial pulses 2+ equal bilaterally.   0830: Awake. OPA dc'd  0842: Pt awake alert. Denies pain and nausea. Tolerating sips of water  0850: Pt instructed to use incentive spirometer. Able to demonstrated proper use.  Pt achieved max 750  0855: Pt son Ed updated via phone on progress, POC, ETA for discharge  0900: Meets criteria for Stage 2. Pt resting. Denies pain and nausea. Stable on room air. Will transfer patient once Stage 2 RN becomes available  0915: No change  0930: No change  0940: Pt to Stage 2

## 2022-03-11 NOTE — ANESTHESIA PROCEDURE NOTES
Airway    Date/Time: 3/11/2022 7:09 AM  Performed by: Tobey Gansert, M.D.  Authorized by: Tobey Gansert, M.D.     Location:  OR  Urgency:  Elective  Indications for Airway Management:  Anesthesia      Spontaneous Ventilation: absent    Sedation Level:  Deep  Preoxygenated: Yes    Final Airway Type:  Supraglottic airway  Final Supraglottic Airway:  Standard LMA    SGA Size:  3  Number of Attempts at Approach:  1

## 2022-03-11 NOTE — ANESTHESIA PREPROCEDURE EVALUATION
Case: [id number] Date/Time: 03/11/22 0645    Procedures:       DECOMPRESSION, SHOULDER, ARTHROSCOPIC - SUBACROMIAL (Right )      ARTHROSCOPIC LABRAL DEBRIDEMENT      ARTHROSCOPY, SHOULDER, WITH ROTATOR CUFF REPAIR - POSSIBLE      ARTHROPLASTY,BALLOON - POSSIBLE, AND REPAIRS AS INDICATED    Pre-op diagnosis: IMPINGEMENT SYNDROME OF RIGHT SHOULDER    Location: [facility] OR 05 / SURGERY [facility]    Surgeons: Annette Bains M.D.          Relevant Problems   No relevant active problems       Physical Exam    Airway   Mallampati: II  TM distance: >3 FB  Neck ROM: full       Cardiovascular - normal exam  Rhythm: regular  Rate: normal  (-) murmur     Dental - normal exam           Pulmonary - normal exam  Breath sounds clear to auscultation     Abdominal    Neurological - normal exam                 Anesthesia Plan    ASA 2       Plan - general and peripheral nerve block     Peripheral nerve block will be post-op pain control  Airway plan will be LMA          Induction: intravenous    Postoperative Plan: Postoperative administration of opioids is intended.    Pertinent diagnostic labs and testing reviewed    Informed Consent:    Anesthetic plan and risks discussed with patient.    Use of blood products discussed with: patient whom consented to blood products.

## 2022-04-05 NOTE — DOCUMENTATION QUERY
[facility]                                                                       Query Response Note      PATIENT:               PAULA ALBERTS  ACCT #:                  [id number]  MRN:                     [id number]  :                      1940  ADMIT DATE:       3/11/2022 5:20 AM  DISCH DATE:        3/11/2022 10:20 AM  RESPONDING  PROVIDER #:        [id number]           QUERY TEXT:    Could you please clarify if the Rotator Cuff Tears are a component of the Shoulder Impingement Syndrome or if they are separate conditions.    1) RCT component is a component of the Shoulder Impingement Syndrome  2) RCT is a separate condition  3) other Please specify:    The patient's clinical indicators include:  Rotator Cuff Tear and Impingement Syndrome        Query created by: Joana Frazier on 3/30/2022 1:31 PM    RESPONSE TEXT:    RCT is a separate condition          Electronically signed by:  FRANDY MARIN MD 2022 1:45 PM

## 2022-05-02 ENCOUNTER — PRE-ADMISSION TESTING (OUTPATIENT)
Dept: ADMISSIONS | Facility: MEDICAL CENTER | Age: 82
End: 2022-05-02
Attending: ORTHOPAEDIC SURGERY
Payer: MEDICARE

## 2022-05-02 RX ORDER — TRAMADOL HYDROCHLORIDE 50 MG/1
1 TABLET ORAL EVERY 4 HOURS PRN
COMMUNITY
Start: 2022-04-19

## 2022-05-02 RX ORDER — DICYCLOMINE HYDROCHLORIDE 10 MG/1
10 CAPSULE ORAL 2 TIMES DAILY PRN
COMMUNITY
Start: 2022-03-22

## 2022-05-02 RX ORDER — OXYCODONE AND ACETAMINOPHEN 7.5; 325 MG/1; MG/1
1 TABLET ORAL EVERY 4 HOURS PRN
COMMUNITY
Start: 2022-04-28

## 2022-05-02 NOTE — PREPROCEDURE INSTRUCTIONS
Pre-admit appointment completed.  Pt instructed to continue regularly prescribed medications through the day before surgery. Pt instructed to take the following medications the day of surgery with a sip of water, per anesthesia protocol; sotolol, and if needed-bentyl, percocet or ultram, tums    MET's=>4.

## 2022-05-06 ENCOUNTER — ANESTHESIA EVENT (OUTPATIENT)
Dept: SURGERY | Facility: MEDICAL CENTER | Age: 82
End: 2022-05-06
Payer: MEDICARE

## 2022-05-06 ENCOUNTER — HOSPITAL ENCOUNTER (OUTPATIENT)
Facility: MEDICAL CENTER | Age: 82
End: 2022-05-06
Attending: ORTHOPAEDIC SURGERY | Admitting: ORTHOPAEDIC SURGERY
Payer: MEDICARE

## 2022-05-06 ENCOUNTER — ANESTHESIA (OUTPATIENT)
Dept: SURGERY | Facility: MEDICAL CENTER | Age: 82
End: 2022-05-06
Payer: MEDICARE

## 2022-05-06 VITALS
TEMPERATURE: 97.3 F | DIASTOLIC BLOOD PRESSURE: 70 MMHG | SYSTOLIC BLOOD PRESSURE: 126 MMHG | BODY MASS INDEX: 27.48 KG/M2 | OXYGEN SATURATION: 91 % | HEIGHT: 64 IN | WEIGHT: 160.94 LBS | RESPIRATION RATE: 16 BRPM | HEART RATE: 57 BPM

## 2022-05-06 PROCEDURE — A9270 NON-COVERED ITEM OR SERVICE: HCPCS | Performed by: ANESTHESIOLOGY

## 2022-05-06 PROCEDURE — 160041 HCHG SURGERY MINUTES - EA ADDL 1 MIN LEVEL 4: Performed by: ORTHOPAEDIC SURGERY

## 2022-05-06 PROCEDURE — 700111 HCHG RX REV CODE 636 W/ 250 OVERRIDE (IP): Performed by: ANESTHESIOLOGY

## 2022-05-06 PROCEDURE — 160025 RECOVERY II MINUTES (STATS): Performed by: ORTHOPAEDIC SURGERY

## 2022-05-06 PROCEDURE — 700111 HCHG RX REV CODE 636 W/ 250 OVERRIDE (IP)

## 2022-05-06 PROCEDURE — 700101 HCHG RX REV CODE 250: Performed by: ANESTHESIOLOGY

## 2022-05-06 PROCEDURE — 700105 HCHG RX REV CODE 258: Performed by: ORTHOPAEDIC SURGERY

## 2022-05-06 PROCEDURE — 160009 HCHG ANES TIME/MIN: Performed by: ORTHOPAEDIC SURGERY

## 2022-05-06 PROCEDURE — 99100 ANES PT EXTEME AGE<1 YR&>70: CPT | Performed by: ANESTHESIOLOGY

## 2022-05-06 PROCEDURE — 01630 ANES OPN/ARTHR PX SHO JT NOS: CPT | Performed by: ANESTHESIOLOGY

## 2022-05-06 PROCEDURE — 160029 HCHG SURGERY MINUTES - 1ST 30 MINS LEVEL 4: Performed by: ORTHOPAEDIC SURGERY

## 2022-05-06 PROCEDURE — 501838 HCHG SUTURE GENERAL: Performed by: ORTHOPAEDIC SURGERY

## 2022-05-06 PROCEDURE — 700111 HCHG RX REV CODE 636 W/ 250 OVERRIDE (IP): Performed by: ORTHOPAEDIC SURGERY

## 2022-05-06 PROCEDURE — 160035 HCHG PACU - 1ST 60 MINS PHASE I: Performed by: ORTHOPAEDIC SURGERY

## 2022-05-06 PROCEDURE — 160036 HCHG PACU - EA ADDL 30 MINS PHASE I: Performed by: ORTHOPAEDIC SURGERY

## 2022-05-06 PROCEDURE — 700102 HCHG RX REV CODE 250 W/ 637 OVERRIDE(OP): Performed by: ANESTHESIOLOGY

## 2022-05-06 PROCEDURE — 160048 HCHG OR STATISTICAL LEVEL 1-5: Performed by: ORTHOPAEDIC SURGERY

## 2022-05-06 PROCEDURE — 160046 HCHG PACU - 1ST 60 MINS PHASE II: Performed by: ORTHOPAEDIC SURGERY

## 2022-05-06 PROCEDURE — 700101 HCHG RX REV CODE 250: Performed by: ORTHOPAEDIC SURGERY

## 2022-05-06 PROCEDURE — 160002 HCHG RECOVERY MINUTES (STAT): Performed by: ORTHOPAEDIC SURGERY

## 2022-05-06 RX ORDER — MEPERIDINE HYDROCHLORIDE 25 MG/ML
12.5 INJECTION INTRAMUSCULAR; INTRAVENOUS; SUBCUTANEOUS
Status: DISCONTINUED | OUTPATIENT
Start: 2022-05-06 | End: 2022-05-06 | Stop reason: HOSPADM

## 2022-05-06 RX ORDER — BUPIVACAINE HYDROCHLORIDE AND EPINEPHRINE 5; 5 MG/ML; UG/ML
INJECTION, SOLUTION PERINEURAL
Status: DISCONTINUED | OUTPATIENT
Start: 2022-05-06 | End: 2022-05-06 | Stop reason: HOSPADM

## 2022-05-06 RX ORDER — SODIUM CHLORIDE, SODIUM LACTATE, POTASSIUM CHLORIDE, CALCIUM CHLORIDE 600; 310; 30; 20 MG/100ML; MG/100ML; MG/100ML; MG/100ML
INJECTION, SOLUTION INTRAVENOUS CONTINUOUS
Status: DISCONTINUED | OUTPATIENT
Start: 2022-05-06 | End: 2022-05-06 | Stop reason: HOSPADM

## 2022-05-06 RX ORDER — DEXAMETHASONE SODIUM PHOSPHATE 4 MG/ML
INJECTION, SOLUTION INTRA-ARTICULAR; INTRALESIONAL; INTRAMUSCULAR; INTRAVENOUS; SOFT TISSUE PRN
Status: DISCONTINUED | OUTPATIENT
Start: 2022-05-06 | End: 2022-05-06 | Stop reason: SURG

## 2022-05-06 RX ORDER — OXYCODONE HCL 5 MG/5 ML
10 SOLUTION, ORAL ORAL
Status: COMPLETED | OUTPATIENT
Start: 2022-05-06 | End: 2022-05-06

## 2022-05-06 RX ORDER — HYDRALAZINE HYDROCHLORIDE 20 MG/ML
5 INJECTION INTRAMUSCULAR; INTRAVENOUS
Status: DISCONTINUED | OUTPATIENT
Start: 2022-05-06 | End: 2022-05-06 | Stop reason: HOSPADM

## 2022-05-06 RX ORDER — HYDROMORPHONE HYDROCHLORIDE 1 MG/ML
0.1 INJECTION, SOLUTION INTRAMUSCULAR; INTRAVENOUS; SUBCUTANEOUS
Status: DISCONTINUED | OUTPATIENT
Start: 2022-05-06 | End: 2022-05-06 | Stop reason: HOSPADM

## 2022-05-06 RX ORDER — OXYCODONE HCL 5 MG/5 ML
5 SOLUTION, ORAL ORAL
Status: COMPLETED | OUTPATIENT
Start: 2022-05-06 | End: 2022-05-06

## 2022-05-06 RX ORDER — EPINEPHRINE 1 MG/ML(1)
AMPUL (ML) INJECTION
Status: DISCONTINUED | OUTPATIENT
Start: 2022-05-06 | End: 2022-05-06 | Stop reason: HOSPADM

## 2022-05-06 RX ORDER — LIDOCAINE HYDROCHLORIDE 20 MG/ML
INJECTION, SOLUTION EPIDURAL; INFILTRATION; INTRACAUDAL; PERINEURAL PRN
Status: DISCONTINUED | OUTPATIENT
Start: 2022-05-06 | End: 2022-05-06 | Stop reason: SURG

## 2022-05-06 RX ORDER — ACETAMINOPHEN 500 MG
1000 TABLET ORAL EVERY 6 HOURS PRN
Status: DISCONTINUED | OUTPATIENT
Start: 2022-05-06 | End: 2022-05-06 | Stop reason: HOSPADM

## 2022-05-06 RX ORDER — LIDOCAINE HYDROCHLORIDE 10 MG/ML
INJECTION, SOLUTION EPIDURAL; INFILTRATION; INTRACAUDAL; PERINEURAL
Status: COMPLETED
Start: 2022-05-06 | End: 2022-05-06

## 2022-05-06 RX ORDER — CEFAZOLIN SODIUM 1 G/3ML
INJECTION, POWDER, FOR SOLUTION INTRAMUSCULAR; INTRAVENOUS PRN
Status: DISCONTINUED | OUTPATIENT
Start: 2022-05-06 | End: 2022-05-06 | Stop reason: SURG

## 2022-05-06 RX ORDER — ONDANSETRON 2 MG/ML
4 INJECTION INTRAMUSCULAR; INTRAVENOUS
Status: DISCONTINUED | OUTPATIENT
Start: 2022-05-06 | End: 2022-05-06 | Stop reason: HOSPADM

## 2022-05-06 RX ORDER — KETOROLAC TROMETHAMINE 30 MG/ML
INJECTION, SOLUTION INTRAMUSCULAR; INTRAVENOUS PRN
Status: DISCONTINUED | OUTPATIENT
Start: 2022-05-06 | End: 2022-05-06 | Stop reason: SURG

## 2022-05-06 RX ORDER — HYDROMORPHONE HYDROCHLORIDE 1 MG/ML
0.2 INJECTION, SOLUTION INTRAMUSCULAR; INTRAVENOUS; SUBCUTANEOUS
Status: DISCONTINUED | OUTPATIENT
Start: 2022-05-06 | End: 2022-05-06 | Stop reason: HOSPADM

## 2022-05-06 RX ORDER — MIDAZOLAM HYDROCHLORIDE 1 MG/ML
INJECTION INTRAMUSCULAR; INTRAVENOUS PRN
Status: DISCONTINUED | OUTPATIENT
Start: 2022-05-06 | End: 2022-05-06 | Stop reason: SURG

## 2022-05-06 RX ORDER — DIPHENHYDRAMINE HYDROCHLORIDE 50 MG/ML
6.25 INJECTION INTRAMUSCULAR; INTRAVENOUS
Status: DISCONTINUED | OUTPATIENT
Start: 2022-05-06 | End: 2022-05-06 | Stop reason: HOSPADM

## 2022-05-06 RX ORDER — HYDROMORPHONE HYDROCHLORIDE 1 MG/ML
0.4 INJECTION, SOLUTION INTRAMUSCULAR; INTRAVENOUS; SUBCUTANEOUS
Status: DISCONTINUED | OUTPATIENT
Start: 2022-05-06 | End: 2022-05-06 | Stop reason: HOSPADM

## 2022-05-06 RX ORDER — ONDANSETRON 2 MG/ML
INJECTION INTRAMUSCULAR; INTRAVENOUS PRN
Status: DISCONTINUED | OUTPATIENT
Start: 2022-05-06 | End: 2022-05-06 | Stop reason: SURG

## 2022-05-06 RX ORDER — HALOPERIDOL 5 MG/ML
1 INJECTION INTRAMUSCULAR
Status: DISCONTINUED | OUTPATIENT
Start: 2022-05-06 | End: 2022-05-06 | Stop reason: HOSPADM

## 2022-05-06 RX ADMIN — LIDOCAINE HYDROCHLORIDE 50 MG: 20 INJECTION, SOLUTION EPIDURAL; INFILTRATION; INTRACAUDAL; PERINEURAL at 13:52

## 2022-05-06 RX ADMIN — EPHEDRINE SULFATE 10 MG: 50 INJECTION INTRAMUSCULAR; INTRAVENOUS; SUBCUTANEOUS at 14:01

## 2022-05-06 RX ADMIN — KETOROLAC TROMETHAMINE 15 MG: 30 INJECTION, SOLUTION INTRAMUSCULAR at 14:17

## 2022-05-06 RX ADMIN — PROPOFOL 150 MG: 10 INJECTION, EMULSION INTRAVENOUS at 13:52

## 2022-05-06 RX ADMIN — FENTANYL CITRATE 25 MCG: 50 INJECTION, SOLUTION INTRAMUSCULAR; INTRAVENOUS at 15:13

## 2022-05-06 RX ADMIN — DEXAMETHASONE SODIUM PHOSPHATE 8 MG: 4 INJECTION, SOLUTION INTRAMUSCULAR; INTRAVENOUS at 13:56

## 2022-05-06 RX ADMIN — ACETAMINOPHEN 1000 MG: 500 TABLET, FILM COATED ORAL at 14:46

## 2022-05-06 RX ADMIN — ONDANSETRON 4 MG: 2 INJECTION INTRAMUSCULAR; INTRAVENOUS at 14:12

## 2022-05-06 RX ADMIN — CEFAZOLIN 2 G: 330 INJECTION, POWDER, FOR SOLUTION INTRAMUSCULAR; INTRAVENOUS at 13:52

## 2022-05-06 RX ADMIN — FENTANYL CITRATE 50 MCG: 50 INJECTION, SOLUTION INTRAMUSCULAR; INTRAVENOUS at 14:07

## 2022-05-06 RX ADMIN — FENTANYL CITRATE 50 MCG: 50 INJECTION, SOLUTION INTRAMUSCULAR; INTRAVENOUS at 13:52

## 2022-05-06 RX ADMIN — OXYCODONE HYDROCHLORIDE 5 MG: 5 SOLUTION ORAL at 14:44

## 2022-05-06 RX ADMIN — SODIUM CHLORIDE, POTASSIUM CHLORIDE, SODIUM LACTATE AND CALCIUM CHLORIDE: 600; 310; 30; 20 INJECTION, SOLUTION INTRAVENOUS at 13:44

## 2022-05-06 RX ADMIN — LIDOCAINE HYDROCHLORIDE 0.5 ML: 10 INJECTION, SOLUTION EPIDURAL; INFILTRATION; INTRACAUDAL; PERINEURAL at 13:44

## 2022-05-06 RX ADMIN — FENTANYL CITRATE 25 MCG: 50 INJECTION, SOLUTION INTRAMUSCULAR; INTRAVENOUS at 14:45

## 2022-05-06 RX ADMIN — MIDAZOLAM HYDROCHLORIDE 1 MG: 1 INJECTION, SOLUTION INTRAMUSCULAR; INTRAVENOUS at 13:52

## 2022-05-06 ASSESSMENT — PAIN DESCRIPTION - PAIN TYPE
TYPE: SURGICAL PAIN
TYPE: SURGICAL PAIN
TYPE: ACUTE PAIN
TYPE: SURGICAL PAIN

## 2022-05-06 NOTE — ANESTHESIA TIME REPORT
Anesthesia Start and Stop Event Times     Date Time Event    5/6/2022 1304 Ready for Procedure     1348 Anesthesia Start     1425 Anesthesia Stop        Responsible Staff  05/06/22    Name Role Begin End    Escobar Saldivar M.D. Anesth 1348 1427        Overtime Reason:  no overtime (within assigned shift)    Comments:

## 2022-05-06 NOTE — OR NURSING
1422: Patient arrived to PACU from OR via [place]. Report received from anesthesia and RN. BP 80/47, all other VS WNL on 6 L mask with OPA in place. Dressing to right shoulder clean, dry and intact. Sling in place. Cold pack applied. LR infusing.    1428: BP improved, patient opens eyes to voice and quickly falls back asleep. VSS    1434: OPA removed.Patient more alert. Tolerating water at this time. Denies nausea.    1450: Patient placed on room air. Patient still c/o pain to right shoulder rating 6/10. Not at a tolerable level per patient. See MAR.     1500: Patients son updated. Continuing to work on pain control.     1535: Patient states pain is at a tolerable level. Declines further pain medication. Denies nausea. VSS on room air.     1538: Patient meets criteria to transfer to stage 2. Report called to Corrine LORENZ and patients transported to stage 2 via [facility] by CNA.

## 2022-05-06 NOTE — ANESTHESIA PREPROCEDURE EVALUATION
Case: [id number] Date/Time: 05/06/22 1445    Procedures:       ARTHROSCOPY, SHOULDER, WITH EXTENSIVE DEBRIDEMENT - LABRAL (Right )      REMOVAL, HARDWARE - LOOSE ANCHOR AND REPAIRS AS INDICATED    Pre-op diagnosis: IMPINGEMENT SYNDROME OF RIGHT SHOULDER    Location: [facility] OR 05 / SURGERY [facility]    Surgeons: Annette Bains M.D.          Relevant Problems   No relevant active problems     Past Medical History:   Diagnosis Date   • Abnormal EKG 2020    LBBB-had stress test done to evaluate   • Anemia    • Arrhythmia     A-Fib. Follows with Dr Jeremy Rene ([facility]).   • Arthritis     fingers   • Dry cough     5/2/22-RESOLVED   • Elevated hemoglobin A1c 11/04/2021    5.8   • Heart burn     tums prn   • History of IBS    • Hypertension    • Precancerous skin lesion     Moles excised at office.   • Right shoulder pain    • Urinary incontinence     Stress incontinence         Physical Exam    Airway   Mallampati: II  TM distance: >3 FB  Neck ROM: full       Cardiovascular - normal exam  Rhythm: regular  Rate: normal  (-) murmur     Dental - normal exam           Pulmonary - normal exam  Breath sounds clear to auscultation     Abdominal    Neurological - normal exam                 Anesthesia Plan    ASA 2       Plan - general       Airway plan will be LMA    (Discussed with pt and surgeon that block likely not necessary. Pt can request in PACU if having significant pain)      Induction: intravenous    Postoperative Plan: Postoperative administration of opioids is intended.    Pertinent diagnostic labs and testing reviewed    Informed Consent:    Anesthetic plan and risks discussed with patient.

## 2022-05-06 NOTE — OR NURSING
1538: Pt arrived to stage 2 via [facility] from PACU.  Pt dressed and sitting up in chair.  Pt has no complaints.    1558:  Son at chairside.    1610: D/Yves to care of son post uneventful stay in PACU 2.

## 2022-05-06 NOTE — ANESTHESIA PROCEDURE NOTES
Airway    Date/Time: 5/6/2022 1:52 PM  Performed by: Escobar Saldivar M.D.  Authorized by: Escobar Saldivar M.D.     Location:  OR  Urgency:  Elective  Difficult Airway: No    Indications for Airway Management:  Anesthesia      Spontaneous Ventilation: absent    Sedation Level:  Deep  Preoxygenated: Yes    Mask Difficulty Assessment:  0 - not attempted  Final Airway Type:  Supraglottic airway  Final Supraglottic Airway:  Standard LMA    SGA Size:  3  Number of Attempts at Approach:  1

## 2022-05-06 NOTE — OP REPORT
DATE OF SERVICE:  05/06/2022     PREOPERATIVE DIAGNOSIS:  Right shoulder loose anchor status post rotator cuff   repair and balloon arthroplasty.      POSTOPERATIVE DIAGNOSIS:  Right shoulder loose anchor status post rotator cuff   repair and balloon arthroplasty.      PROCEDURES:  Right shoulder arthroscopy, removal loose anchor, rotator cuff   debridement.     SURGEON:  Annette Bains MD     ASSISTANT:  Gagandeep Castillo CFA     ANESTHESIOLOGIST:  Escobar Saldivar MD     TYPE OF ANESTHESIA:  General.     INTRAVENOUS FLUID:  1 liter crystalloid.     ESTIMATED BLOOD LOSS:  Minimal.     DRAINS:  None.     SPECIMENS:  None.     COMPLICATIONS:  None.     IMPLANTS:  None.     EXPLANTS: Debbi ReelX anchor x1.     REASON FOR PROCEDURE:  The patient is an 81-year-old female who underwent a   right shoulder arthroscopy with a rotator cuff repair and balloon arthroplasty   approximately 7 weeks ago.  She sustained a fall about 3 weeks ago and since   then has had an increased amount of pain.  Plain x-rays demonstrated loose   Debbi ReelX anchor.  We decided to proceed with removal and inspection of   the prior repair.     DESCRIPTION OF PROCEDURE:  The patient was taken to the operating room.  A   breathing tube was placed.  She was then placed in the lateral decubitus   position.  Care was taken to pad her axilla as well as all bony prominences.    The right shoulder was examined under anesthesia.  She was noted to have   slightly limited range of motion.  A gentle manipulation was performed to gain   her full range of motion.  The right upper extremity was then prepped with   ChloraPrep and draped in standard sterile fashion.  It was then placed in the   Arthrex traction device.  Bony landmarks were drawn and a standard posterior   portal was established.  The arthroscope was then inserted into the   glenohumeral joint.  An anterosuperior cannula was placed in the rotator   interval, just beneath the biceps tendon.  A probe  was then inserted.  An   anterosuperior portal was established in the rotator interval region.  A   [place] ReelX anchor was noted loose and out of the bone completely.    Expansion of the anchor was appropriate with the appropriate amount of suture,   but the anchor had simply pulled out of the bone.  An arthroscopic flush   cutter was used to remove the sutures and a [place] was then used to carefully   remove the anchor.  The anchor was removed.  The subscapularis tendon had not   healed the upper rolled edge.  However, there was not a large retracted   full-thickness tear.  Its edges were debrided.  The glenohumeral joint   cartilage surfaces demonstrated slight softening, but no significant arthritic   change.  The arthroscope was then carefully inserted into the lateral aspect   of the subacromial space.  There was a significant bursal scarring.  The   previously placed balloon was not easily visible.  In order to eliminate the   risk of injuring or popping the balloon, the arthroscope was simply removed.    All portals were closed with 3-0 nylon suture.  A sterile dressing was   applied.  A 0.5% Marcaine with epinephrine was then placed in the subcutaneous   tissue around the area of the portals.  Sterile dressing was applied and all   drapes were removed.  The arm was carefully placed into a shoulder abduction   sling.  The patient was placed supine on a stretcher and taken to recovery   room, in stable condition.        ______________________________  MD NAN RODGERS/JEANINE    DD:  05/06/2022 14:32  DT:  05/06/2022 15:49    Job#:  [id number]

## 2022-05-06 NOTE — ANESTHESIA POSTPROCEDURE EVALUATION
Patient: Elvi Huggins    Procedure Summary     Date: 05/06/22 Room / Location:  OR 05 / SURGERY [facility]    Anesthesia Start: 1348 Anesthesia Stop: 1425    Procedures:       ARTHROSCOPY, SHOULDER, DEBRIDEMENT (Right Shoulder)      REMOVAL, HARDWARE - LOOSE ANCHOR (Right Shoulder) Diagnosis: (LOOSE ANCHOR)    Surgeons: Annette Bains M.D. Responsible Provider: Escobar Saldivar M.D.    Anesthesia Type: general ASA Status: 2          Final Anesthesia Type: general  Last vitals  BP   Blood Pressure : 142/70    Temp   36.4 °C (97.5 °F)    Pulse   (!) 54   Resp   14    SpO2   91 %      Anesthesia Post Evaluation    Patient location during evaluation: PACU  Patient participation: complete - patient participated  Level of consciousness: awake and alert    Airway patency: patent  Anesthetic complications: no  Cardiovascular status: hemodynamically stable  Respiratory status: acceptable  Hydration status: euvolemic    PONV: none          No complications documented.     Nurse Pain Score: 5 (NPRS)

## 2022-05-06 NOTE — OR NURSING
Patient allergies and NPO status verified, home medication reconciliation completed and belongings secured. Surgical site verified with patient. Patient verbalizes understanding of pain scale, expected course of stay and plan of care; patient and family state verbal understanding at this time. IV access established. Sequentials/teds in place as ordered.     Pt sent to OR before noting multimodal orders in pre-op.

## 2022-05-06 NOTE — OR SURGEON
Immediate Post OP Note    PreOp Diagnosis: Right shoulder loose anchor s/p RCR and balloon arthroplasty      PostOp Diagnosis: SAME       Procedure(s):  RIGHT  ARTHROSCOPY, SHOULDER, DEBRIDEMENT - Wound Class: Clean  REMOVAL, HARDWARE - LOOSE ANCHOR - Wound Class: Clean    Surgeon(s):  Annette Bains M.D.    Anesthesiologist/Type of Anesthesia:  Anesthesiologist: Escobar Saldivar M.D./General    Surgical Staff:  Assistant: Gagandeep Castillo C.[email or web address]  Circulator: Ivan Mclean R.N.  Scrub Person: Haleigh Santillan    Specimens removed if any:  * No specimens in log *    Estimated Blood Loss: min    Findings: as above    Complications: none    Explants - One [place] REELX anchor        5/6/2022 2:26 PM Annette Bains M.D.

## 2022-05-06 NOTE — DISCHARGE INSTRUCTIONS
ACTIVITY: Rest and take it easy for the first 24 hours.  A responsible adult is recommended to remain with you during that time.  It is normal to feel sleepy.  We encourage you to not do anything that requires balance, judgment or coordination.    MILD FLU-LIKE SYMPTOMS ARE NORMAL. YOU MAY EXPERIENCE GENERALIZED MUSCLE ACHES, THROAT IRRITATION, HEADACHE AND/OR SOME NAUSEA.    FOR 24 HOURS DO NOT:  Drive, operate machinery or run household appliances.  Drink beer or alcoholic beverages.   Make important decisions or sign legal documents.    SPECIAL INSTRUCTIONS: Post-Operative Instructions - Shoulder Arthroscopy   Dressing and wound care: Keep your shoulder dressing clean and dry after surgery.  Be aware that some leaking of blood or fluid from your dressing can occur and is normal. You may remove your dressing 3 days after the operation.  Notice that you have a single incision and/or several small incisions that have been closed with sutures. Cover each of these incisions with a light dressing or band-aids.  This keeps the surgical incisions clean, as well as preventing your clothes from spotting with blood or fluid.  Change band-aids or light dressing daily.   Shower / bathing: Keep the shoulder dry for 3 days after your surgery.  Then, you may shower. You may let soap and water run over skin incisions, but do not immerse your shoulder in water.  No swimming pools, hot tubs, or baths are recommended until at least 6 weeks after surgery.  Ice: Apply an ice pack to your shoulder (15 minutes on the shoulder, 15 minutes off the shoulder), as you feel necessary to help with the pain and swelling.   If you have a cold therapy, use liberally as directed. Sling / Shoulder Immobilizer: YOU MAY REMOVE THE SLING AT ANY TIME AND DO LIGHT DAILY ACTIVITIES AS YOUR PAIN LEVEL ALLOWS.  NO SPECIFIC RESTRICTIONS.  Activity: It is important to move your shoulder, as well as your elbow, wrist, and hand several times daily, starting  the day after surgery.  You may do pendulum exercises with your operative arm starting the day after surgery.  Pendulum (dangling [place]) exercises are encouraged 3-5 times daily.   Pain medication: Take your pain medicine, as needed and prescribed.  Do not drive or operate machinery while taking narcotic pain medication.   You may start or resume anti-inflammatory medication (i.e. ibuprofen, naproxen) anytime after surgery, which should be taken with food to avoid stomach irritation. Driving: You may drive as soon as you are off narcotic pain medication and feel comfortable behind the wheel.  Loosen or remove your sling, supporting your forearm on a pillow when you drive.  It is important for both hands to have access to the steering wheel for safety.  Problems: If you are having problems with your shoulder (unexpected pain, excessive bleeding or discharge from your incisions, fevers/chills) do not hesitate to call the office or visit the nearest emergency room for evaluation.  Follow-up: Make sure that you have an appointment 7-14 days following surgery.  Your stitches will be removed, and your procedure/rehabilitation will be discussed at that time.   Physical therapy may be prescribed at that time. Annette Bains MD [facility] [phone]    DIET: To avoid nausea, slowly advance diet as tolerated, avoiding spicy or greasy foods for the first day.  Add more substantial food to your diet according to your physician's instructions.  Babies can be fed formula or breast milk as soon as they are hungry.  INCREASE FLUIDS AND FIBER TO AVOID CONSTIPATION.        FOLLOW-UP APPOINTMENT:  A follow-up appointment should be arranged with your doctor; call to schedule.    You should CALL YOUR PHYSICIAN if you develop:  Fever greater than 101 degrees F.  Pain not relieved by medication, or persistent nausea or vomiting.  Excessive bleeding (blood soaking through dressing) or unexpected drainage from the  wound.  Extreme redness or swelling around the incision site, drainage of pus or foul smelling drainage.  Inability to urinate or empty your bladder within 8 hours.  Problems with breathing or chest pain.    You should call 911 if you develop problems with breathing or chest pain.  If you are unable to contact your doctor or surgical center, you should go to the nearest emergency room or urgent care center.  Physician's telephone #: [phone]    If any questions arise, call your doctor.  If your doctor is not available, please feel free to call the Surgical Center at [phone].     A registered nurse may call you a few days after your surgery to see how you are doing after your procedure.    MEDICATIONS: Resume taking daily medication.  Take prescribed pain medication with food.  If no medication is prescribed, you may take non-aspirin pain medication if needed.  PAIN MEDICATION CAN BE VERY CONSTIPATING.  Take a stool softener or laxative such as senokot, pericolace, or milk of magnesia if needed.      Last pain medication given at 2:45 PM (5 mg oxycodone and 1000 mg tylenol).    If your physician has prescribed pain medication that includes Acetaminophen (Tylenol), do not take additional Acetaminophen (Tylenol) while taking the prescribed medication.    Depression / Suicide Risk    As you are discharged from this [facility] facility, it is important to learn how to keep safe from harming yourself.    Recognize the warning signs:  · Abrupt changes in personality, positive or negative- including increase in energy   · Giving away possessions  · Change in eating patterns- significant weight changes-  positive or negative  · Change in sleeping patterns- unable to sleep or sleeping all the time   · Unwillingness or inability to communicate  · Depression  · Unusual sadness, discouragement and loneliness  · Talk of wanting to die  · Neglect of personal appearance   · Rebelliousness- reckless  behavior  · Withdrawal from people/activities they love  · Confusion- inability to concentrate     If you or a loved one observes any of these behaviors or has concerns about self-harm, here's what you can do:  · Talk about it- your feelings and reasons for harming yourself  · Remove any means that you might use to hurt yourself (examples: pills, rope, extension cords, firearm)  · Get professional help from the community (Mental Health, Substance Abuse, psychological counseling)  · Do not be alone:Call your Safe Contact- someone whom you trust who will be there for you.  · Call your local CRISIS HOTLINE 826-2600 or 191-899-6860  · Call your local Children's Mobile Crisis Response Team [place] (543) 019-9805 or www.Pley  · Call the toll free National Suicide Prevention Hotlines   · National Suicide Prevention Lifeline 168-838-NUVP (5930)  · National Hope Line Network 800-SUICIDE (454-1328)

## 2022-11-09 ENCOUNTER — PATIENT MESSAGE (OUTPATIENT)
Dept: HEALTH INFORMATION MANAGEMENT | Facility: OTHER | Age: 82
End: 2022-11-09

## 2023-11-29 ENCOUNTER — PATIENT MESSAGE (OUTPATIENT)
Dept: HEALTH INFORMATION MANAGEMENT | Facility: OTHER | Age: 83
End: 2023-11-29

## 2024-05-30 ENCOUNTER — HOSPITAL ENCOUNTER (OUTPATIENT)
Dept: RADIOLOGY | Facility: MEDICAL CENTER | Age: 84
End: 2024-05-30
Attending: NURSE PRACTITIONER
Payer: MEDICARE

## 2024-05-30 DIAGNOSIS — J01.91 ACUTE RECURRENT SINUSITIS, UNSPECIFIED LOCATION: ICD-10-CM

## 2024-08-05 ASSESSMENT — ENCOUNTER SYMPTOMS
NIGHT SWEATS: 0
IRREGULAR HEARTBEAT: 0
PALPITATIONS: 0
COUGH: 0
FOCAL WEAKNESS: 0
ABDOMINAL PAIN: 0
WHEEZING: 0
WEAKNESS: 0
NEAR-SYNCOPE: 0
DYSPNEA ON EXERTION: 0
FEVER: 0
PND: 0
SYNCOPE: 0
DIZZINESS: 0
NAUSEA: 0
VOMITING: 0
ORTHOPNEA: 0
SHORTNESS OF BREATH: 0
DIARRHEA: 0

## 2024-08-05 NOTE — PROGRESS NOTES
Cardiology Initial Consultation Note    Date of note:    8/6/2024    Primary Care Provider: Quoc Sánchez M.D.  Referring Provider: Quoc Sánchez M.D.    Patient Name: Elvi Huggins   YOB: 1940  MRN:              [id number]    Chief Complaint: Paroxysmal AF and pre-op cardiovascular exam    History of Present Illness: Ms. Elvi Huggins is an 83 y.o. female whose current medical problems include paroxysmal atrial fibrillation, hypertension, and left bundle branch block who is here for cardiac consultation for paroxysmal AF and pre-op cardiovascular exam.    The patient previously followed at [facility], last seen by Dr. Rene on 9/27/2023.     The patient presents today to establish care. The patient presents with her brother. The patient can go up two flights of stairs without any chest pain or shortness of breath.  She does report leg pain.  She denies any orthopnea, PND, or leg swelling.  No palpitations.  No syncope or presyncopal episodes.      Cardiovascular Risk Factors:  1. Smoking status: Never smoker  2. Type II Diabetes Mellitus: None  Lab Results   Component Value Date/Time    HBA1C 5.6 05/04/2022 10:46 AM    HBA1C 5.8 (H) 11/04/2021 06:34 AM     3. Hypertension: On medication  4. Dyslipidemia: None/not recently checked  5. Family history of early Coronary Artery Disease in a first degree relative (Male less than 55 years of age; Female less than 65 years of age): None  6.  Obesity and/or Metabolic Syndrome: Body mass index is 31.24 kg/m².  7. Sedentary lifestyle: Not sedentary    Review of Systems   Constitutional: Negative for fever, malaise/fatigue and night sweats.   Cardiovascular:  Negative for chest pain, dyspnea on exertion, irregular heartbeat, leg swelling, near-syncope, orthopnea, palpitations, paroxysmal nocturnal dyspnea and syncope.   Respiratory:  Negative for cough, shortness of breath and wheezing.    Gastrointestinal:  Negative for abdominal pain,  "diarrhea, nausea and vomiting.   Neurological:  Negative for dizziness, focal weakness and weakness.       All other systems reviewed and are negative.     Current Outpatient Medications   Medication Sig Dispense Refill    fluticasone (FLONASE) 50 MCG/ACT nasal spray 1 Spray every day.      folic acid (FOLVITE) 1 MG Tab Take 1 mg by mouth every day.      Multiple Vitamin (MULTIVITAMIN) capsule Take  by mouth.      dicyclomine (BENTYL) 10 MG Cap Take 10 mg by mouth 2 times a day as needed.      Calcium Carbonate Antacid (TUMS PO) Take 1-2 Tablets by mouth as needed.      aspirin 81 MG EC tablet Take 81 mg by mouth 2 times a day.      Calcium Carb-Cholecalciferol (CALCIUM 1000 + D) 1000-800 MG-UNIT Tab Take 1 Tablet by mouth every day.      sotalol (BETAPACE) 120 MG tablet 1/2 TAB TWICE A DAY      lisinopril (PRINIVIL) 20 MG Tab Take 20 mg by mouth every evening.       No current facility-administered medications for this visit.         Allergies   Allergen Reactions    Codeine Nausea    Levofloxacin Hives and Itching     Immediately itches     Physical Exam:  Ambulatory Vitals  /68 (BP Location: Left arm, Patient Position: Sitting, BP Cuff Size: Adult)   Pulse 73   Resp 16   Ht 1.626 m (5' 4\")   Wt 82.6 kg (182 lb)   SpO2 95%    Oxygen Therapy:  Pulse Oximetry: 95 %  BP Readings from Last 4 Encounters:   08/06/24 128/68   05/06/22 126/70   03/11/22 149/88       Weight/BMI: Body mass index is 31.24 kg/m².  Wt Readings from Last 4 Encounters:   08/06/24 82.6 kg (182 lb)   05/06/22 73 kg (160 lb 15 oz)   03/11/22 75.3 kg (166 lb 0.1 oz)   07/19/21 73 kg (161 lb)         General: Well appearing and in no apparent distress  Eyes: nl conjunctiva, no icteric sclera  ENT: normal external appearance of ears, nose, and throat  Neck: no visible JVP,  no carotid bruits  Lungs: normal respiratory effort, CTAB  Heart: RRR, no murmurs, no rubs or gallops,  no edema bilateral lower extremities. No LV/RV heave on cardiac " "palpatation. + bilateral radial pulses.  + bilateral dp pulses.   Abdomen: soft, non tender, non distended, no masses, normal bowel sounds.  No HSM.  Extremities/MSK: no clubbing, no cyanosis  Neurological: No focal sensory deficits  Psychiatric: Appropriate affect, A/O x 3, intact judgement and insight  Skin: Warm extremities      Lab Data Review:  No results found for: \"CHOLSTRLTOT\", \"LDL\", \"HDL\", \"TRIGLYCERIDE\"    Lab Results   Component Value Date/Time    SODIUM 140 05/04/2022 10:46 AM    POTASSIUM 4.1 05/04/2022 10:46 AM    CHLORIDE 105 05/04/2022 10:46 AM    CO2 25 05/04/2022 10:46 AM    GLUCOSE 106 (H) 05/04/2022 10:46 AM    BUN 14 05/04/2022 10:46 AM    CREATININE 1.09 05/04/2022 10:46 AM    BUNCREATRAT 14 11/04/2021 06:34 AM     Lab Results   Component Value Date/Time    ALKPHOSPHAT 84 11/04/2021 06:34 AM    ASTSGOT 18 11/04/2021 06:34 AM    ALTSGPT 14 11/04/2021 06:34 AM    TBILIRUBIN 0.4 11/04/2021 06:34 AM      Lab Results   Component Value Date/Time    WBC 5.7 05/04/2022 10:46 AM    HEMOGLOBIN 13.9 05/04/2022 10:46 AM     Lab Results   Component Value Date/Time    HBA1C 5.6 05/04/2022 10:46 AM    HBA1C 5.8 (H) 11/04/2021 06:34 AM         Cardiac Imaging and Procedures Review:    EKG dated 8/6/2024: My personal interpretation is sinus rhythm, left bundle branch block    Echo dated 7/14/2020:   <Conclusion>  1. The left ventricle is normal in size and function with no regional wall motion abnormalities and a   n ejection fraction of 60-65% by Lilly's biplane. There is grade 1 (impaired relaxation) diastolic   dysfunction with normal LV filling pressures.   2. The aortic valve is trileaflet with mild aortic valve sclerosis without evidence of stenosis. Ther   e is trace aortic regurgitation.   3. There is mild tricuspid regurgitaiton.   4. The right ventricle is normal in size and function. RVSP is normal, measuring 35 mmHg.       Nuclear Perfusion Imaging (5/24/2023): -[facility]      1. Negative pharmacological stress test for ischemia.  2. Small fixed inferoapical and apical septal defect possibly due to breast attenuation but cannot rule out infarct.  Wall motion in this area is normal.  3. Normal left ventricular systolic function with a calculated ejection fraction of 73% with normal wall motion.  4. No chest pain with pharmacologic stress.  5. Indeterminate EKG portion of stress test due to left bundle branch block.      Assessment & Plan     1. Paroxysmal A-fib (HCC)  EKG    EC-ECHOCARDIOGRAM COMPLETE W/O CONT      2. Pain in both lower extremities  US-EXTREMITY ARTERY LOWER BILAT W/JAY (COMBO)      3. Essential hypertension  EKG    EC-ECHOCARDIOGRAM COMPLETE W/O CONT      4. Abnormal echocardiogram  EKG    EC-ECHOCARDIOGRAM COMPLETE W/O CONT      5. Preop cardiovascular exam  EC-ECHOCARDIOGRAM COMPLETE W/O CONT      6. Hypercoagulable state due to paroxysmal atrial fibrillation (HCC)        7. Aortic valve sclerosis  EC-ECHOCARDIOGRAM COMPLETE W/O CONT      8. Mild tricuspid regurgitation  EC-ECHOCARDIOGRAM COMPLETE W/O CONT      9. LBBB (left bundle branch block)  EKG    EC-ECHOCARDIOGRAM COMPLETE W/O CONT            Shared Medical Decision Making:    Preop cardiovascular exam  Left bundle branch block  Leg pain  Patient denies any chest pain or shortness of breath but does report leg pain  -Will plan for US extremity with JAY to assess for any ischemia  -Will obtain echocardiogram to assess LVEF and structural abnormalities given known left bundle branch block  -Will send note to Dr. Vera ([facility]) after the above studies.    Paroxysmal atrial fibrillation  Hypercoagulable state due to AF  [id number] at least 4 (age x 2, female, hypertension)  -Patient declined anticoagulation understanding risks and benefits.  Patient would like to discuss again next visit  -Continue sotalol  -Continue aspirin 81 mg daily    Essential hypertension  BP controlled this  visit  -Continue lisinopril    Aortic sclerosis  Mild tricuspid regurgitation  Euvolemic on exam  -Repeat echocardiogram as above      All of the patient's excellent questions were answered to the best of my knowledge and to her satisfaction.  It was a pleasure seeing Ms. Elvi Huggins in my clinic today. Return in about 3 months (around 11/6/2024). Patient is aware to call the cardiology clinic with any questions or concerns.      Yoshi Russ MD  [facility] [place] [facility], [facility]  [address] [place] [id number]  Phone: [phone]  Fax: [phone]

## 2024-08-06 ENCOUNTER — OFFICE VISIT (OUTPATIENT)
Dept: CARDIOLOGY | Facility: MEDICAL CENTER | Age: 84
End: 2024-08-06
Attending: STUDENT IN AN ORGANIZED HEALTH CARE EDUCATION/TRAINING PROGRAM
Payer: MEDICARE

## 2024-08-06 VITALS
RESPIRATION RATE: 16 BRPM | HEIGHT: 64 IN | WEIGHT: 182 LBS | DIASTOLIC BLOOD PRESSURE: 68 MMHG | SYSTOLIC BLOOD PRESSURE: 128 MMHG | OXYGEN SATURATION: 95 % | BODY MASS INDEX: 31.07 KG/M2 | HEART RATE: 73 BPM

## 2024-08-06 DIAGNOSIS — I44.7 LBBB (LEFT BUNDLE BRANCH BLOCK): ICD-10-CM

## 2024-08-06 DIAGNOSIS — I10 ESSENTIAL HYPERTENSION: ICD-10-CM

## 2024-08-06 DIAGNOSIS — M79.604 PAIN IN BOTH LOWER EXTREMITIES: ICD-10-CM

## 2024-08-06 DIAGNOSIS — Z01.810 PREOP CARDIOVASCULAR EXAM: ICD-10-CM

## 2024-08-06 DIAGNOSIS — M79.605 PAIN IN BOTH LOWER EXTREMITIES: ICD-10-CM

## 2024-08-06 DIAGNOSIS — D68.69 HYPERCOAGULABLE STATE DUE TO PAROXYSMAL ATRIAL FIBRILLATION (HCC): ICD-10-CM

## 2024-08-06 DIAGNOSIS — I07.1 MILD TRICUSPID REGURGITATION: ICD-10-CM

## 2024-08-06 DIAGNOSIS — I48.0 HYPERCOAGULABLE STATE DUE TO PAROXYSMAL ATRIAL FIBRILLATION (HCC): ICD-10-CM

## 2024-08-06 DIAGNOSIS — I35.8 AORTIC VALVE SCLEROSIS: ICD-10-CM

## 2024-08-06 DIAGNOSIS — I48.0 PAROXYSMAL A-FIB (HCC): ICD-10-CM

## 2024-08-06 DIAGNOSIS — R93.1 ABNORMAL ECHOCARDIOGRAM: ICD-10-CM

## 2024-08-06 LAB — EKG IMPRESSION: NORMAL

## 2024-08-06 PROCEDURE — 93005 ELECTROCARDIOGRAM TRACING: CPT | Performed by: STUDENT IN AN ORGANIZED HEALTH CARE EDUCATION/TRAINING PROGRAM

## 2024-08-06 PROCEDURE — 3074F SYST BP LT 130 MM HG: CPT | Performed by: STUDENT IN AN ORGANIZED HEALTH CARE EDUCATION/TRAINING PROGRAM

## 2024-08-06 PROCEDURE — 99212 OFFICE O/P EST SF 10 MIN: CPT | Performed by: STUDENT IN AN ORGANIZED HEALTH CARE EDUCATION/TRAINING PROGRAM

## 2024-08-06 PROCEDURE — 99204 OFFICE O/P NEW MOD 45 MIN: CPT | Performed by: STUDENT IN AN ORGANIZED HEALTH CARE EDUCATION/TRAINING PROGRAM

## 2024-08-06 PROCEDURE — 93010 ELECTROCARDIOGRAM REPORT: CPT | Performed by: STUDENT IN AN ORGANIZED HEALTH CARE EDUCATION/TRAINING PROGRAM

## 2024-08-06 PROCEDURE — 3078F DIAST BP <80 MM HG: CPT | Performed by: STUDENT IN AN ORGANIZED HEALTH CARE EDUCATION/TRAINING PROGRAM

## 2024-08-06 RX ORDER — MULTIVITAMIN
CAPSULE ORAL
COMMUNITY

## 2024-08-06 RX ORDER — FLUTICASONE PROPIONATE 50 MCG
1 SPRAY, SUSPENSION (ML) NASAL DAILY
COMMUNITY

## 2024-08-06 RX ORDER — FOLIC ACID 1 MG/1
1 TABLET ORAL DAILY
COMMUNITY

## 2024-08-15 ENCOUNTER — HOSPITAL ENCOUNTER (OUTPATIENT)
Dept: CARDIOLOGY | Facility: MEDICAL CENTER | Age: 84
End: 2024-08-15
Attending: STUDENT IN AN ORGANIZED HEALTH CARE EDUCATION/TRAINING PROGRAM
Payer: MEDICARE

## 2024-08-15 DIAGNOSIS — I44.7 LBBB (LEFT BUNDLE BRANCH BLOCK): ICD-10-CM

## 2024-08-15 DIAGNOSIS — I07.1 MILD TRICUSPID REGURGITATION: ICD-10-CM

## 2024-08-15 DIAGNOSIS — I10 ESSENTIAL HYPERTENSION: ICD-10-CM

## 2024-08-15 DIAGNOSIS — Z01.810 PREOP CARDIOVASCULAR EXAM: ICD-10-CM

## 2024-08-15 DIAGNOSIS — I35.8 AORTIC VALVE SCLEROSIS: ICD-10-CM

## 2024-08-15 DIAGNOSIS — I48.0 PAROXYSMAL A-FIB (HCC): ICD-10-CM

## 2024-08-15 DIAGNOSIS — R93.1 ABNORMAL ECHOCARDIOGRAM: ICD-10-CM

## 2024-08-15 PROCEDURE — 93306 TTE W/DOPPLER COMPLETE: CPT

## 2024-08-15 PROCEDURE — 700117 HCHG RX CONTRAST REV CODE 255: Performed by: STUDENT IN AN ORGANIZED HEALTH CARE EDUCATION/TRAINING PROGRAM

## 2024-08-15 RX ADMIN — HUMAN ALBUMIN MICROSPHERES AND PERFLUTREN 3 ML: 10; .22 INJECTION, SOLUTION INTRAVENOUS at 16:30

## 2024-08-16 ENCOUNTER — TELEPHONE (OUTPATIENT)
Dept: CARDIOLOGY | Facility: MEDICAL CENTER | Age: 84
End: 2024-08-16
Payer: MEDICARE

## 2024-08-16 PROCEDURE — 93306 TTE W/DOPPLER COMPLETE: CPT | Mod: 26 | Performed by: STUDENT IN AN ORGANIZED HEALTH CARE EDUCATION/TRAINING PROGRAM

## 2024-08-16 NOTE — TELEPHONE ENCOUNTER
Called and discussed with patient, patient verbalized understanding.    Drafted and forwarded clearance

## 2024-08-16 NOTE — TELEPHONE ENCOUNTER
"----- Message from Physician Yoshi Russ M.D. sent at 8/16/2024 11:08 AM PDT -----  Could you let her know that her echo looks good with normal function.     Could you also send note to her ENT - Dr. Vera ([facility])?   \"The patient is a low to moderate risk patient for an intermediate risk procedure. No further cardiac workup necessary prior to procedure.\"    Thank you!    "

## 2024-09-02 ENCOUNTER — APPOINTMENT (OUTPATIENT)
Dept: RADIOLOGY | Facility: MEDICAL CENTER | Age: 84
End: 2024-09-02
Attending: STUDENT IN AN ORGANIZED HEALTH CARE EDUCATION/TRAINING PROGRAM
Payer: MEDICARE

## 2024-09-12 ENCOUNTER — HOSPITAL ENCOUNTER (OUTPATIENT)
Facility: MEDICAL CENTER | Age: 84
End: 2024-09-12
Attending: OTOLARYNGOLOGY | Admitting: OTOLARYNGOLOGY
Payer: MEDICARE

## 2024-09-13 ENCOUNTER — APPOINTMENT (OUTPATIENT)
Dept: ADMISSIONS | Facility: MEDICAL CENTER | Age: 84
End: 2024-09-13
Attending: OTOLARYNGOLOGY
Payer: MEDICARE

## 2024-09-19 ENCOUNTER — PRE-ADMISSION TESTING (OUTPATIENT)
Dept: ADMISSIONS | Facility: MEDICAL CENTER | Age: 84
End: 2024-09-19
Attending: STUDENT IN AN ORGANIZED HEALTH CARE EDUCATION/TRAINING PROGRAM
Payer: MEDICARE

## 2024-09-30 ENCOUNTER — PRE-ADMISSION TESTING (OUTPATIENT)
Dept: ADMISSIONS | Facility: MEDICAL CENTER | Age: 84
End: 2024-09-30
Attending: STUDENT IN AN ORGANIZED HEALTH CARE EDUCATION/TRAINING PROGRAM
Payer: MEDICARE

## 2024-09-30 ENCOUNTER — APPOINTMENT (OUTPATIENT)
Dept: RADIOLOGY | Facility: MEDICAL CENTER | Age: 84
End: 2024-09-30
Attending: STUDENT IN AN ORGANIZED HEALTH CARE EDUCATION/TRAINING PROGRAM
Payer: MEDICARE

## 2024-09-30 DIAGNOSIS — Z01.812 PRE-OPERATIVE LABORATORY EXAMINATION: ICD-10-CM

## 2024-09-30 LAB
ALBUMIN SERPL BCP-MCNC: 4 G/DL (ref 3.2–4.9)
ALBUMIN/GLOB SERPL: 1.3 G/DL
ALP SERPL-CCNC: 106 U/L (ref 30–99)
ALT SERPL-CCNC: 11 U/L (ref 2–50)
ANION GAP SERPL CALC-SCNC: 14 MMOL/L (ref 7–16)
AST SERPL-CCNC: 16 U/L (ref 12–45)
BILIRUB SERPL-MCNC: 0.3 MG/DL (ref 0.1–1.5)
BUN SERPL-MCNC: 14 MG/DL (ref 8–22)
CALCIUM ALBUM COR SERPL-MCNC: 9.7 MG/DL (ref 8.5–10.5)
CALCIUM SERPL-MCNC: 9.7 MG/DL (ref 8.5–10.5)
CHLORIDE SERPL-SCNC: 102 MMOL/L (ref 96–112)
CO2 SERPL-SCNC: 22 MMOL/L (ref 20–33)
CREAT SERPL-MCNC: 0.94 MG/DL (ref 0.5–1.4)
ERYTHROCYTE [DISTWIDTH] IN BLOOD BY AUTOMATED COUNT: 47.7 FL (ref 35.9–50)
GFR SERPLBLD CREATININE-BSD FMLA CKD-EPI: 59 ML/MIN/1.73 M 2
GLOBULIN SER CALC-MCNC: 3.1 G/DL (ref 1.9–3.5)
GLUCOSE SERPL-MCNC: 95 MG/DL (ref 65–99)
HCT VFR BLD AUTO: 43.6 % (ref 37–47)
HGB BLD-MCNC: 14.1 G/DL (ref 12–16)
MCH RBC QN AUTO: 32 PG (ref 27–33)
MCHC RBC AUTO-ENTMCNC: 32.3 G/DL (ref 32.2–35.5)
MCV RBC AUTO: 99.1 FL (ref 81.4–97.8)
PLATELET # BLD AUTO: 255 K/UL (ref 164–446)
PMV BLD AUTO: 11 FL (ref 9–12.9)
POTASSIUM SERPL-SCNC: 4.2 MMOL/L (ref 3.6–5.5)
PROT SERPL-MCNC: 7.1 G/DL (ref 6–8.2)
RBC # BLD AUTO: 4.4 M/UL (ref 4.2–5.4)
SODIUM SERPL-SCNC: 138 MMOL/L (ref 135–145)
WBC # BLD AUTO: 6 K/UL (ref 4.8–10.8)

## 2024-09-30 PROCEDURE — 80053 COMPREHEN METABOLIC PANEL: CPT

## 2024-09-30 PROCEDURE — 85027 COMPLETE CBC AUTOMATED: CPT

## 2024-09-30 PROCEDURE — 36415 COLL VENOUS BLD VENIPUNCTURE: CPT

## 2024-10-28 ENCOUNTER — HOSPITAL ENCOUNTER (OUTPATIENT)
Dept: RADIOLOGY | Facility: MEDICAL CENTER | Age: 84
End: 2024-10-28
Attending: STUDENT IN AN ORGANIZED HEALTH CARE EDUCATION/TRAINING PROGRAM
Payer: MEDICARE

## 2024-10-28 DIAGNOSIS — M79.605 PAIN IN BOTH LOWER EXTREMITIES: ICD-10-CM

## 2024-10-28 DIAGNOSIS — M79.604 PAIN IN BOTH LOWER EXTREMITIES: ICD-10-CM

## 2024-10-28 PROCEDURE — 93922 UPR/L XTREMITY ART 2 LEVELS: CPT

## 2024-11-05 ENCOUNTER — APPOINTMENT (OUTPATIENT)
Dept: CARDIOLOGY | Facility: MEDICAL CENTER | Age: 84
End: 2024-11-05
Attending: STUDENT IN AN ORGANIZED HEALTH CARE EDUCATION/TRAINING PROGRAM
Payer: MEDICARE

## 2024-12-03 ENCOUNTER — HOSPITAL ENCOUNTER (OUTPATIENT)
Dept: RADIOLOGY | Facility: MEDICAL CENTER | Age: 84
End: 2024-12-03
Attending: OTOLARYNGOLOGY
Payer: MEDICARE

## 2024-12-03 DIAGNOSIS — R22.1 NECK MASS: ICD-10-CM

## 2024-12-03 LAB
CYTOLOGY REG CYTOL: NORMAL
PATHOLOGY CONSULT NOTE: NORMAL

## 2024-12-03 PROCEDURE — 88305 TISSUE EXAM BY PATHOLOGIST: CPT

## 2024-12-03 PROCEDURE — 76942 ECHO GUIDE FOR BIOPSY: CPT

## 2024-12-03 PROCEDURE — 88173 CYTOPATH EVAL FNA REPORT: CPT

## 2024-12-03 PROCEDURE — 10005 FNA BX W/US GDN 1ST LES: CPT

## 2024-12-03 NOTE — PROGRESS NOTES
US guided left neck mass biopsy done by Dr. Castro; NON-SEDATION (no H&P required as this is a NON SEDATION procedure) Left anterior aspect of neck access site, dressing CDI; 140 mL fluid aspirated, 60 mL fluid sent to lab. Pt tolerated the procedure well. Pt hemodynamically stable pre/intra/post procedure; all questions and concerns answered prior to being d/c; patient provided with appropriate education for procedure; pt d/c home.

## 2024-12-13 ENCOUNTER — PRE-ADMISSION TESTING (OUTPATIENT)
Dept: ADMISSIONS | Facility: MEDICAL CENTER | Age: 84
End: 2024-12-13
Attending: OTOLARYNGOLOGY
Payer: MEDICARE

## 2024-12-14 NOTE — OR NURSING
Preadmit appointment completed with patient on 12-13-24.  Patient's computer is down so unable to get medication instructions.  Per patient she did write down instructions.  RN will mail instructions but unsure they will get to patient prior to procedure.  Medication Instructions also emailed in-case patient's computer is up and working.  EKG results from EKG done on 8/6/2024 faxed to Dr. Erik Vera's office today 12-13-24.

## 2024-12-16 ENCOUNTER — ANESTHESIA EVENT (OUTPATIENT)
Dept: SURGERY | Facility: MEDICAL CENTER | Age: 84
End: 2024-12-16
Payer: MEDICARE

## 2024-12-16 ENCOUNTER — ANESTHESIA (OUTPATIENT)
Dept: SURGERY | Facility: MEDICAL CENTER | Age: 84
End: 2024-12-16
Payer: MEDICARE

## 2024-12-16 ENCOUNTER — HOSPITAL ENCOUNTER (OUTPATIENT)
Facility: MEDICAL CENTER | Age: 84
End: 2024-12-16
Attending: OTOLARYNGOLOGY | Admitting: OTOLARYNGOLOGY
Payer: MEDICARE

## 2024-12-16 VITALS
HEART RATE: 65 BPM | OXYGEN SATURATION: 95 % | RESPIRATION RATE: 17 BRPM | WEIGHT: 157.63 LBS | BODY MASS INDEX: 26.91 KG/M2 | SYSTOLIC BLOOD PRESSURE: 122 MMHG | HEIGHT: 64 IN | TEMPERATURE: 97.5 F | DIASTOLIC BLOOD PRESSURE: 66 MMHG

## 2024-12-16 DIAGNOSIS — J32.2 CHRONIC ETHMOIDITIS: ICD-10-CM

## 2024-12-16 PROBLEM — R73.09 ELEVATED HEMOGLOBIN A1C: Status: ACTIVE | Noted: 2021-11-04

## 2024-12-16 PROBLEM — R94.31 ABNORMAL EKG: Status: ACTIVE | Noted: 2020-01-01

## 2024-12-16 LAB
ANION GAP SERPL CALC-SCNC: 10 MMOL/L (ref 7–16)
BUN SERPL-MCNC: 14 MG/DL (ref 8–22)
CALCIUM SERPL-MCNC: 8.9 MG/DL (ref 8.5–10.5)
CHLORIDE SERPL-SCNC: 106 MMOL/L (ref 96–112)
CO2 SERPL-SCNC: 22 MMOL/L (ref 20–33)
CREAT SERPL-MCNC: 0.78 MG/DL (ref 0.5–1.4)
GFR SERPLBLD CREATININE-BSD FMLA CKD-EPI: 74 ML/MIN/1.73 M 2
GLUCOSE SERPL-MCNC: 100 MG/DL (ref 65–99)
GRAM STN SPEC: NORMAL
PATHOLOGY CONSULT NOTE: NORMAL
POTASSIUM SERPL-SCNC: 3.8 MMOL/L (ref 3.6–5.5)
SIGNIFICANT IND 70042: NORMAL
SITE SITE: NORMAL
SODIUM SERPL-SCNC: 138 MMOL/L (ref 135–145)
SOURCE SOURCE: NORMAL

## 2024-12-16 PROCEDURE — A9270 NON-COVERED ITEM OR SERVICE: HCPCS | Performed by: ANESTHESIOLOGY

## 2024-12-16 PROCEDURE — 700105 HCHG RX REV CODE 258: Performed by: ANESTHESIOLOGY

## 2024-12-16 PROCEDURE — 700111 HCHG RX REV CODE 636 W/ 250 OVERRIDE (IP): Performed by: ANESTHESIOLOGY

## 2024-12-16 PROCEDURE — 87070 CULTURE OTHR SPECIMN AEROBIC: CPT

## 2024-12-16 PROCEDURE — 160002 HCHG RECOVERY MINUTES (STAT): Performed by: OTOLARYNGOLOGY

## 2024-12-16 PROCEDURE — 87075 CULTR BACTERIA EXCEPT BLOOD: CPT

## 2024-12-16 PROCEDURE — 700102 HCHG RX REV CODE 250 W/ 637 OVERRIDE(OP): Performed by: ANESTHESIOLOGY

## 2024-12-16 PROCEDURE — 700101 HCHG RX REV CODE 250: Performed by: OTOLARYNGOLOGY

## 2024-12-16 PROCEDURE — 160009 HCHG ANES TIME/MIN: Performed by: OTOLARYNGOLOGY

## 2024-12-16 PROCEDURE — 700111 HCHG RX REV CODE 636 W/ 250 OVERRIDE (IP): Performed by: OTOLARYNGOLOGY

## 2024-12-16 PROCEDURE — 160035 HCHG PACU - 1ST 60 MINS PHASE I: Performed by: OTOLARYNGOLOGY

## 2024-12-16 PROCEDURE — 88311 DECALCIFY TISSUE: CPT

## 2024-12-16 PROCEDURE — 87077 CULTURE AEROBIC IDENTIFY: CPT

## 2024-12-16 PROCEDURE — 160046 HCHG PACU - 1ST 60 MINS PHASE II: Performed by: OTOLARYNGOLOGY

## 2024-12-16 PROCEDURE — 160039 HCHG SURGERY MINUTES - EA ADDL 1 MIN LEVEL 3: Performed by: OTOLARYNGOLOGY

## 2024-12-16 PROCEDURE — 160025 RECOVERY II MINUTES (STATS): Performed by: OTOLARYNGOLOGY

## 2024-12-16 PROCEDURE — 87076 CULTURE ANAEROBE IDENT EACH: CPT

## 2024-12-16 PROCEDURE — 160028 HCHG SURGERY MINUTES - 1ST 30 MINS LEVEL 3: Performed by: OTOLARYNGOLOGY

## 2024-12-16 PROCEDURE — 80048 BASIC METABOLIC PNL TOTAL CA: CPT

## 2024-12-16 PROCEDURE — 87205 SMEAR GRAM STAIN: CPT

## 2024-12-16 PROCEDURE — 160048 HCHG OR STATISTICAL LEVEL 1-5: Performed by: OTOLARYNGOLOGY

## 2024-12-16 PROCEDURE — 700101 HCHG RX REV CODE 250: Performed by: ANESTHESIOLOGY

## 2024-12-16 PROCEDURE — 88305 TISSUE EXAM BY PATHOLOGIST: CPT

## 2024-12-16 PROCEDURE — 36415 COLL VENOUS BLD VENIPUNCTURE: CPT

## 2024-12-16 DEVICE — SPLINT INTRANASAL STERILE POSISEP X 0.6IN X 2.0IN (5EA/PK): Type: IMPLANTABLE DEVICE | Site: NOSE | Status: FUNCTIONAL

## 2024-12-16 RX ORDER — EPINEPHRINE 1 MG/ML(1)
AMPUL (ML) INJECTION
Status: DISCONTINUED | OUTPATIENT
Start: 2024-12-16 | End: 2024-12-16 | Stop reason: HOSPADM

## 2024-12-16 RX ORDER — EPINEPHRINE 1 MG/ML
INJECTION INTRAMUSCULAR; INTRAVENOUS; SUBCUTANEOUS
Status: DISCONTINUED
Start: 2024-12-16 | End: 2024-12-16 | Stop reason: HOSPADM

## 2024-12-16 RX ORDER — OXYCODONE HCL 5 MG/5 ML
10 SOLUTION, ORAL ORAL
Status: DISCONTINUED | OUTPATIENT
Start: 2024-12-16 | End: 2024-12-16 | Stop reason: HOSPADM

## 2024-12-16 RX ORDER — ALBUTEROL SULFATE 5 MG/ML
2.5 SOLUTION RESPIRATORY (INHALATION)
Status: DISCONTINUED | OUTPATIENT
Start: 2024-12-16 | End: 2024-12-16 | Stop reason: HOSPADM

## 2024-12-16 RX ORDER — HYDROMORPHONE HYDROCHLORIDE 1 MG/ML
0.2 INJECTION, SOLUTION INTRAMUSCULAR; INTRAVENOUS; SUBCUTANEOUS
Status: DISCONTINUED | OUTPATIENT
Start: 2024-12-16 | End: 2024-12-16 | Stop reason: HOSPADM

## 2024-12-16 RX ORDER — EPINEPHRINE 1 MG/ML(1)
AMPUL (ML) INJECTION
Status: DISCONTINUED
Start: 2024-12-16 | End: 2024-12-16 | Stop reason: HOSPADM

## 2024-12-16 RX ORDER — DIPHENHYDRAMINE HYDROCHLORIDE 50 MG/ML
12.5 INJECTION INTRAMUSCULAR; INTRAVENOUS
Status: DISCONTINUED | OUTPATIENT
Start: 2024-12-16 | End: 2024-12-16 | Stop reason: HOSPADM

## 2024-12-16 RX ORDER — HYDROMORPHONE HYDROCHLORIDE 1 MG/ML
0.4 INJECTION, SOLUTION INTRAMUSCULAR; INTRAVENOUS; SUBCUTANEOUS
Status: DISCONTINUED | OUTPATIENT
Start: 2024-12-16 | End: 2024-12-16 | Stop reason: HOSPADM

## 2024-12-16 RX ORDER — SODIUM CHLORIDE 9 MG/ML
INJECTION, SOLUTION INTRAVENOUS
Status: DISCONTINUED | OUTPATIENT
Start: 2024-12-16 | End: 2024-12-16 | Stop reason: SURG

## 2024-12-16 RX ORDER — LIDOCAINE HYDROCHLORIDE 10 MG/ML
INJECTION, SOLUTION EPIDURAL; INFILTRATION; INTRACAUDAL; PERINEURAL
Status: DISCONTINUED
Start: 2024-12-16 | End: 2024-12-16 | Stop reason: HOSPADM

## 2024-12-16 RX ORDER — HYDROMORPHONE HYDROCHLORIDE 1 MG/ML
0.1 INJECTION, SOLUTION INTRAMUSCULAR; INTRAVENOUS; SUBCUTANEOUS
Status: DISCONTINUED | OUTPATIENT
Start: 2024-12-16 | End: 2024-12-16 | Stop reason: HOSPADM

## 2024-12-16 RX ORDER — LIDOCAINE HYDROCHLORIDE 20 MG/ML
INJECTION, SOLUTION EPIDURAL; INFILTRATION; INTRACAUDAL; PERINEURAL PRN
Status: DISCONTINUED | OUTPATIENT
Start: 2024-12-16 | End: 2024-12-16 | Stop reason: SURG

## 2024-12-16 RX ORDER — SODIUM CHLORIDE, SODIUM LACTATE, POTASSIUM CHLORIDE, CALCIUM CHLORIDE 600; 310; 30; 20 MG/100ML; MG/100ML; MG/100ML; MG/100ML
INJECTION, SOLUTION INTRAVENOUS CONTINUOUS
Status: DISCONTINUED | OUTPATIENT
Start: 2024-12-16 | End: 2024-12-16 | Stop reason: HOSPADM

## 2024-12-16 RX ORDER — HALOPERIDOL 5 MG/ML
1 INJECTION INTRAMUSCULAR
Status: DISCONTINUED | OUTPATIENT
Start: 2024-12-16 | End: 2024-12-16 | Stop reason: HOSPADM

## 2024-12-16 RX ORDER — OXYCODONE HCL 5 MG/5 ML
5 SOLUTION, ORAL ORAL
Status: DISCONTINUED | OUTPATIENT
Start: 2024-12-16 | End: 2024-12-16 | Stop reason: HOSPADM

## 2024-12-16 RX ORDER — BACITRACIN ZINC 500 [USP'U]/G
OINTMENT TOPICAL
Status: DISCONTINUED
Start: 2024-12-16 | End: 2024-12-16 | Stop reason: HOSPADM

## 2024-12-16 RX ORDER — LABETALOL HYDROCHLORIDE 5 MG/ML
5 INJECTION, SOLUTION INTRAVENOUS
Status: DISCONTINUED | OUTPATIENT
Start: 2024-12-16 | End: 2024-12-16 | Stop reason: HOSPADM

## 2024-12-16 RX ORDER — HYDRALAZINE HYDROCHLORIDE 20 MG/ML
5 INJECTION INTRAMUSCULAR; INTRAVENOUS
Status: DISCONTINUED | OUTPATIENT
Start: 2024-12-16 | End: 2024-12-16 | Stop reason: HOSPADM

## 2024-12-16 RX ORDER — LIDOCAINE HYDROCHLORIDE AND EPINEPHRINE 10; 10 MG/ML; UG/ML
INJECTION, SOLUTION INFILTRATION; PERINEURAL
Status: DISCONTINUED | OUTPATIENT
Start: 2024-12-16 | End: 2024-12-16 | Stop reason: HOSPADM

## 2024-12-16 RX ORDER — EPHEDRINE SULFATE 50 MG/ML
5 INJECTION, SOLUTION INTRAVENOUS
Status: DISCONTINUED | OUTPATIENT
Start: 2024-12-16 | End: 2024-12-16 | Stop reason: HOSPADM

## 2024-12-16 RX ORDER — CEFAZOLIN SODIUM 1 G/3ML
INJECTION, POWDER, FOR SOLUTION INTRAMUSCULAR; INTRAVENOUS PRN
Status: DISCONTINUED | OUTPATIENT
Start: 2024-12-16 | End: 2024-12-16 | Stop reason: SURG

## 2024-12-16 RX ORDER — TRANEXAMIC ACID 100 MG/ML
INJECTION, SOLUTION INTRAVENOUS PRN
Status: DISCONTINUED | OUTPATIENT
Start: 2024-12-16 | End: 2024-12-16 | Stop reason: SURG

## 2024-12-16 RX ORDER — ONDANSETRON 2 MG/ML
INJECTION INTRAMUSCULAR; INTRAVENOUS PRN
Status: DISCONTINUED | OUTPATIENT
Start: 2024-12-16 | End: 2024-12-16 | Stop reason: SURG

## 2024-12-16 RX ORDER — SUCCINYLCHOLINE CHLORIDE 20 MG/ML
INJECTION INTRAMUSCULAR; INTRAVENOUS PRN
Status: DISCONTINUED | OUTPATIENT
Start: 2024-12-16 | End: 2024-12-16 | Stop reason: SURG

## 2024-12-16 RX ORDER — DEXAMETHASONE SODIUM PHOSPHATE 4 MG/ML
INJECTION, SOLUTION INTRA-ARTICULAR; INTRALESIONAL; INTRAMUSCULAR; INTRAVENOUS; SOFT TISSUE PRN
Status: DISCONTINUED | OUTPATIENT
Start: 2024-12-16 | End: 2024-12-16 | Stop reason: SURG

## 2024-12-16 RX ORDER — ACETAMINOPHEN 500 MG
1000 TABLET ORAL ONCE
Status: COMPLETED | OUTPATIENT
Start: 2024-12-16 | End: 2024-12-16

## 2024-12-16 RX ADMIN — LIDOCAINE HYDROCHLORIDE 100 MG: 20 INJECTION, SOLUTION EPIDURAL; INFILTRATION; INTRACAUDAL; PERINEURAL at 13:11

## 2024-12-16 RX ADMIN — ACETAMINOPHEN 1000 MG: 500 TABLET ORAL at 12:12

## 2024-12-16 RX ADMIN — SUCCINYLCHOLINE CHLORIDE 80 MG: 20 INJECTION, SOLUTION INTRAMUSCULAR; INTRAVENOUS at 13:16

## 2024-12-16 RX ADMIN — ONDANSETRON 4 MG: 2 INJECTION INTRAMUSCULAR; INTRAVENOUS at 13:45

## 2024-12-16 RX ADMIN — SODIUM CHLORIDE: 9 INJECTION, SOLUTION INTRAVENOUS at 13:11

## 2024-12-16 RX ADMIN — CEFAZOLIN 2 G: 1 INJECTION, POWDER, FOR SOLUTION INTRAMUSCULAR; INTRAVENOUS at 13:18

## 2024-12-16 RX ADMIN — PROPOFOL 150 MCG/KG/MIN: 10 INJECTION, EMULSION INTRAVENOUS at 13:18

## 2024-12-16 RX ADMIN — DEXAMETHASONE SODIUM PHOSPHATE 10 MG: 4 INJECTION INTRA-ARTICULAR; INTRALESIONAL; INTRAMUSCULAR; INTRAVENOUS; SOFT TISSUE at 13:18

## 2024-12-16 RX ADMIN — FENTANYL CITRATE 25 MCG: 50 INJECTION, SOLUTION INTRAMUSCULAR; INTRAVENOUS at 13:42

## 2024-12-16 RX ADMIN — TRANEXAMIC ACID 1000 MG: 100 INJECTION, SOLUTION INTRAVENOUS at 13:18

## 2024-12-16 RX ADMIN — PROPOFOL 100 MG: 10 INJECTION, EMULSION INTRAVENOUS at 13:16

## 2024-12-16 ASSESSMENT — PAIN DESCRIPTION - PAIN TYPE
TYPE: SURGICAL PAIN
TYPE: ACUTE PAIN;SURGICAL PAIN

## 2024-12-16 ASSESSMENT — FIBROSIS 4 INDEX: FIB4 SCORE: 1.63

## 2024-12-16 NOTE — ANESTHESIA PREPROCEDURE EVALUATION
Case: [id number] Date/Time: 12/16/24 1430    Procedures:       RIGHT ENDOSCOPIC MAXILLARY ANTROSTOMY, WITH MAXILLARY TISSUE REMOVAL, RIGHT ENDOSCOPIC CORTEZ BULLOSA RESECTION, STEREOTACTIC COMPUTER-ASSISTED (NAVIGATIONAL) PROCEDURE; CRANIAL, EXTRADURAL, RIGHT ENDOSCOPIC TOTAL ETHMOIDECTOMY INCLUDING ENDOSCOPIC SPHENOIDOTOMY WITH TISSUE REMOVAL, RIGHT ENDOSCOPIC FRONTAL SINUS EXPLORATION, WIDE LOCAL EXCISION RIGHT NASAL MASS (Right: Nose)      EXCISION, NASAL TURBINATE    Anesthesia type: General    Pre-op diagnosis: D14.0    Location: [facility] ROOM 22 / SURGERY SAME DAY [facility]    Surgeons: Erik Vera M.D.          85yo F here for sinus surgery    TTE in August 2024:  Normal left ventricular systolic function. The left ventricular   ejection fraction is visually estimated to be 55-60%.  Grade I   diastolic dysfunction.  Mildly dilated right ventricle. Normal right ventricular systolic   function.  Mild aortic insufficiency.  Mild tricuspid regurgitation. Estimated right ventricular systolic   pressure is 35 mmHg (normal).   The ascending aorta diameter is  3.6 cm.  No prior study is available for comparison.      Relevant Problems   ANESTHESIA   (positive) PONV (postoperative nausea and vomiting)      CARDIAC   (positive) Hypertension      Other   (positive) Abnormal EKG (LBBB, unchanged since last EKG)   (positive) Elevated hemoglobin A1c   (positive) Rotator cuff tear       Physical Exam    Airway   Mallampati: III  TM distance: >3 FB  Neck ROM: limited       Cardiovascular - normal exam  Rhythm: regular  Rate: normal  (-) murmur     Dental - normal exam           Pulmonary - normal exam  Breath sounds clear to auscultation     Abdominal    Neurological - normal exam         Other findings: Very small mouth opening and limited neck extension. Mallamp. IV when tongue stuck out              Anesthesia Plan    ASA 2       Plan - general       Airway plan will be ETT          Induction: intravenous    Postoperative  Plan: Postoperative administration of opioids is intended.    Pertinent diagnostic labs and testing reviewed    Informed Consent:    Anesthetic plan and risks discussed with patient.    Use of blood products discussed with: patient whom consented to blood products.

## 2024-12-16 NOTE — ANESTHESIA POSTPROCEDURE EVALUATION
Patient: Elvi Huggins    Procedure Summary       Date: 12/16/24 Room / Location: [facility] ROOM 22 / SURGERY SAME DAY [facility]    Anesthesia Start: 1311 Anesthesia Stop: 1357    Procedures:       RIGHT ENDOSCOPIC MAXILLARY ANTROSTOMY, WITH MAXILLARY TISSUE REMOVAL, RIGHT ENDOSCOPIC CORTEZ BULLOSA RESECTION, STEREOTACTIC COMPUTER-ASSISTED (NAVIGATIONAL) PROCEDURE; CRANIAL, EXTRADURAL (Right: Nose)      EXCISION, NASAL TURBINATE Diagnosis: (CHRONIC SINUSITIS)    Surgeons: Erik Vera M.D. Responsible Provider: Lizette Hughes M.D.    Anesthesia Type: general ASA Status: 2            Final Anesthesia Type: general  Last vitals  BP   Blood Pressure : 122/66    Temp   36.4 °C (97.5 °F)    Pulse   65   Resp   17    SpO2   95 %      Anesthesia Post Evaluation    Patient location during evaluation: PACU  Patient participation: complete - patient participated  Level of consciousness: awake and alert    Airway patency: patent  Anesthetic complications: no  Cardiovascular status: hemodynamically stable  Respiratory status: acceptable  Hydration status: euvolemic    PONV: none          No notable events documented.     Nurse Pain Score: 2 (NPRS)

## 2024-12-16 NOTE — OP REPORT
DATE OF SERVICE:  12/16/2024     PREOPERATIVE DIAGNOSIS:  Right nasal mass.     POSTOPERATIVE DIAGNOSES:  1.  Right infected deuce bullosa.  2.  Chronic sinusitis.     PROCEDURES:   1.  Right maxillary antrostomy, endoscopic.  2.  Right endoscopic deuce bullosa resection.  3.  With image guidance.     SURGEON:  Erik Vera MD     ASSISTANT:  None.     ANESTHESIA:  General endotracheal.     ANESTHESIOLOGIST:  Dr. Hughes.     ESTIMATED BLOOD LOSS:  10 mL.     COMPLICATIONS:  None.     SPECIMENS:   1.  Right sinus contents.  2.  Right deuce bullosa for culture.     INDICATIONS:  This is an 84-year-old female who had a CT of her sinuses, which   showed a nasal mass.  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  This was needle decompressed a   few weeks ago.  Her symptoms are much better from that.  After discussion of   the risks, benefits and alternatives, she elected to undergo the above   procedure.     FINDINGS:  There was a large deuce bullosa on the right hand side that was   inflamed and completely filled with solid pus.  There was multiple accessory   ostium to the right maxillary.     DESCRIPTION OF PROCEDURE:  The patient was brought to the operating room,   intubated by anesthesia, turned 90 degrees, draped in sterile fashion.    Timeout was performed.  A 1:1000 epinephrine-soaked pledgets stained with   fluorescein placed in the nasal cavities.  Once allowed to take effect, I   injected the right maxillary line, sphenopalatine region and septum with 1%   lidocaine 1:100,000 epinephrine.  I registered the image guidance, was found   to be good fidelity in all 3 planes.  Due to the potential for nasal tumor, I   used it throughout the case to confirm my location.  When I looked in the nose   with a 0-degree endoscope, there was purulence draining spontaneously from   the right edematous deuce bullosa.  I then removed the lateral lamella of    this using a scissor and the microdebrider and Thru-Cutting instruments.    There was very concretion of purulence, which was removed and cultured.  There   were some more solid elements as well, which were sent to pathology.  I   flushed out the deuce bullosa until it was clear purulence.  There was   multiple accessory ostia since she may have recirculation, I elected to   proceed with maxillary antrostomy.  I resected the uncinate with pediatric   backbiter, microdebrider and Kerrisons, identified the natural ostium of the   maxillary, I widened it posteriorly using curved scissor, side biter and the   microdebrider and connected to the accessory ostia.  Hemostasis was good.  I   placed a trimmed piece of PosiSep in the right middle meatus and inflated with   saline.  The patient was awakened from anesthesia and taken to recovery in   stable condition.  Counts were correct.  The patient will be discharged on   Augmentin.        ______________________________  MD HELENE Forman/CARON    DD:  12/16/2024 13:48  DT:  12/16/2024 14:32    Job#:  [id number]

## 2024-12-16 NOTE — OR NURSING
1350: Pt arrived from OR to PACU 7. Connected to monitor. Report received from anesthesia & RN. VSS. Oxygen at 6 via mask. Opa in place. Breaths calm, even, and unlabored.     1357 opa dc'd     1400 pt tolerating water, Updated son on patient status in recovery.     1430: Discharge instructions reviewed with patient and family member. All questions answered, verbalizes understanding.     1434 nasal sling applied    1445: Pt assisted into clothing.   IV and ID bands removed.    1453: Pt then escorted to car via wheelchair, accompanied by tech. All personal belongings & discharge instructions with patient/family.

## 2024-12-16 NOTE — OR SURGEON
Immediate Post OP Note    PreOp Diagnosis: R nasal mass      PostOp Diagnosis: R pyoconcha bullosa, CRS       Procedure(s):  RIGHT ENDOSCOPIC MAXILLARY ANTROSTOMY, WITHout MAXILLARY TISSUE REMOVAL, RIGHT ENDOSCOPIC CORTEZ BULLOSA RESECTION, STEREOTACTIC COMPUTER-ASSISTED (NAVIGATIONAL) PROCEDURE; CRANIAL, EXTRADURAL - Wound Class: Clean Contaminated       Surgeon(s):  Erik Vera M.D.    Anesthesiologist/Type of Anesthesia:  Anesthesiologist: Lizette Hughes M.D./General    Surgical Staff:  Circulator: Monae Baker R.N.  Scrub Person: Nick Hinds    Specimens removed if any:  ID Type Source Tests Collected by Time Destination   1 : RIGHT CORTEZ BULLOSA Tissue Sinus AEROBIC/ANAEROBIC CULTURE (SURGERY) Erik Vera M.D. 12/16/2024  1:28 PM    A : RIGHT SINUS CONTENST Tissue Sinus PATHOLOGY SPECIMEN Erik Vera M.D. 12/16/2024  1:32 PM        Estimated Blood Loss: 10cc    Findings: pus filled R cortez, mult accessory ostia R     Complications: none        12/16/2024 1:43 PM Erik Vera M.D.

## 2024-12-16 NOTE — DISCHARGE INSTRUCTIONS
neilmed rinses 5 times a day start tomorrow 12/17/24  Follow up in 1 week, make appointment if have not done so yet     If any questions arise, call your provider.  If your provider is not available, please feel free to call the [facility] at [phone].    MEDICATIONS: Resume taking daily medication.  Take prescribed pain medication with food.  If no medication is prescribed, you may take non-aspirin pain medication if needed.  PAIN MEDICATION CAN BE VERY CONSTIPATING.  Take a stool softener or laxative such as senokot, pericolace, or milk of magnesia if needed.    Last pain medication given at tylenol at 12:12 pm next dose of tylenol can be given at 6:12 pm    What to Expect Post Anesthesia    Rest and take it easy for the first 24 hours.  A responsible adult is recommended to remain with you during that time.  It is normal to feel sleepy.  We encourage you to not do anything that requires balance, judgment or coordination.    FOR 24 HOURS DO NOT:  Drive, operate machinery or run household appliances.  Drink beer or alcoholic beverages.  Make important decisions or sign legal documents.    To avoid nausea, slowly advance diet as tolerated, avoiding spicy or greasy foods for the first day.  Add more substantial food to your diet according to your provider's instructions.  Babies can be fed formula or breast milk as soon as they are hungry.  INCREASE FLUIDS AND FIBER TO AVOID CONSTIPATION.    MILD FLU-LIKE SYMPTOMS ARE NORMAL.  YOU MAY EXPERIENCE GENERALIZED MUSCLE ACHES, THROAT IRRITATION, HEADACHE AND/OR SOME NAUSEA.

## 2024-12-16 NOTE — ANESTHESIA PROCEDURE NOTES
Airway    Date/Time: 12/16/2024 1:17 PM    Performed by: Lizette Hughes M.D.  Authorized by: Lizette Hughes M.D.    Location:  OR  Urgency:  Elective  Indications for Airway Management:  Anesthesia      Spontaneous Ventilation: absent    Sedation Level:  Deep  Preoxygenated: Yes    Patient Position:  Sniffing  Mask Difficulty Assessment:  0 - not attempted  Final Airway Type:  Endotracheal airway  Final Endotracheal Airway:  ETT  Cuffed: Yes    Technique Used for Successful ETT Placement:  Direct laryngoscopy    Insertion Site:  Oral  Blade Type:  Caroline  Laryngoscope Blade/Videolaryngoscope Blade Size:  3  ETT Size (mm):  6.5  Measured from:  Teeth  ETT to Teeth (cm):  22  Placement Verified by: auscultation and capnometry    Cormack-Lehane Classification:  Grade III - view of epiglottis only  Number of Attempts at Approach:  1

## 2024-12-16 NOTE — ANESTHESIA TIME REPORT
Anesthesia Start and Stop Event Times       Date Time Event    12/16/2024 1252 Ready for Procedure     1311 Anesthesia Start     1357 Anesthesia Stop          Responsible Staff  12/16/24      Name Role Begin End    Lizette Hughes M.D. Anesth 1311 1357          Overtime Reason:  no overtime (within assigned shift)    Comments:

## 2024-12-19 LAB
BACTERIA WND AEROBE CULT: ABNORMAL
GRAM STN SPEC: ABNORMAL
SIGNIFICANT IND 70042: ABNORMAL
SITE SITE: ABNORMAL
SOURCE SOURCE: ABNORMAL

## 2024-12-20 LAB
BACTERIA SPEC ANAEROBE CULT: ABNORMAL
BACTERIA SPEC ANAEROBE CULT: ABNORMAL
SIGNIFICANT IND 70042: ABNORMAL
SITE SITE: ABNORMAL
SOURCE SOURCE: ABNORMAL

## 2025-03-07 ENCOUNTER — APPOINTMENT (OUTPATIENT)
Dept: RADIOLOGY | Facility: MEDICAL CENTER | Age: 85
End: 2025-03-07
Attending: OTOLARYNGOLOGY
Payer: MEDICARE

## 2025-04-07 ENCOUNTER — APPOINTMENT (OUTPATIENT)
Dept: RADIOLOGY | Facility: MEDICAL CENTER | Age: 85
End: 2025-04-07
Attending: OTOLARYNGOLOGY
Payer: MEDICARE

## 2025-04-30 ENCOUNTER — HOSPITAL ENCOUNTER (OUTPATIENT)
Dept: RADIOLOGY | Facility: MEDICAL CENTER | Age: 85
End: 2025-04-30
Attending: OTOLARYNGOLOGY
Payer: MEDICARE

## 2025-04-30 DIAGNOSIS — R13.14 DYSPHAGIA, PHARYNGOESOPHAGEAL PHASE: ICD-10-CM

## 2025-04-30 PROCEDURE — 74220 X-RAY XM ESOPHAGUS 1CNTRST: CPT

## 2025-06-11 ENCOUNTER — HOSPITAL ENCOUNTER (OUTPATIENT)
Facility: MEDICAL CENTER | Age: 85
End: 2025-06-11
Attending: OTOLARYNGOLOGY
Payer: MEDICARE

## 2025-06-11 PROCEDURE — 87070 CULTURE OTHR SPECIMN AEROBIC: CPT

## 2025-06-11 PROCEDURE — 87205 SMEAR GRAM STAIN: CPT

## 2025-06-11 PROCEDURE — 87077 CULTURE AEROBIC IDENTIFY: CPT

## 2025-06-11 PROCEDURE — 87075 CULTR BACTERIA EXCEPT BLOOD: CPT

## 2025-06-11 PROCEDURE — 87186 SC STD MICRODIL/AGAR DIL: CPT

## 2025-06-12 LAB
GRAM STN SPEC: NORMAL
SIGNIFICANT IND 70042: NORMAL
SITE SITE: NORMAL
SOURCE SOURCE: NORMAL

## 2025-06-13 LAB
BACTERIA WND AEROBE CULT: ABNORMAL
BACTERIA WND AEROBE CULT: ABNORMAL
GRAM STN SPEC: ABNORMAL
SIGNIFICANT IND 70042: ABNORMAL
SITE SITE: ABNORMAL
SOURCE SOURCE: ABNORMAL

## 2025-06-14 LAB
BACTERIA SPEC ANAEROBE CULT: NORMAL
SIGNIFICANT IND 70042: NORMAL
SITE SITE: NORMAL
SOURCE SOURCE: NORMAL

## (undated) DEVICE — GLOVE, LITE (PAIR)

## (undated) DEVICE — ELECTRODE 850 FOAM ADHESIVE - HYDROGEL RADIOTRNSPRNT (50/PK)

## (undated) DEVICE — BAG SPONGE COUNT 10.25 X 32 - BLUE (250/CA)

## (undated) DEVICE — WATER IRRIGATION STERILE 1000ML (12EA/CA)

## (undated) DEVICE — ELECTRODE DUAL RETURN W/ CORD - (50/PK)

## (undated) DEVICE — SHAVER, 5.5 RESECTOR

## (undated) DEVICE — MASK ANESTHESIA ADULT  - (100/CA)

## (undated) DEVICE — GLOVE 7.0 LF PF PROTEXIS (50PR/BX)

## (undated) DEVICE — CANNULA O2 COMFORT SOFT EAR ADULT 7 FT TUBING (50/CA)

## (undated) DEVICE — TUBING DAY USE W/CARTRIDGE (10EA/BX)

## (undated) DEVICE — LACTATED RINGERS INJ 1000 ML - (14EA/CA 60CA/PF)

## (undated) DEVICE — HUMID-VENT HEAT AND MOISTURE EXCHANGE- (50/BX)

## (undated) DEVICE — CANNULA THREADED 5X75 (5EA/BX)

## (undated) DEVICE — SENSOR SPO2 NEO LNCS ADHESIVE (20/BX) SEE USER NOTES

## (undated) DEVICE — SHAVER4.0 AGGRESSIVE + FORMLA (5EA/BX)

## (undated) DEVICE — SYRINGE ASEPTO - (50EA/CA

## (undated) DEVICE — GLOVE BIOGEL SZ 7.5 SURGICAL PF LTX - (50PR/BX 4BX/CA)

## (undated) DEVICE — SODIUM CHL IRRIGATION 0.9% 1000ML (12EA/CA)

## (undated) DEVICE — NEEDLE W/FACET TIP DULL VERSION W/STIMULATION CABLE SONOPLEX 21G X 4 (10/EA)"

## (undated) DEVICE — SHEATH SHARP SITE 30 DEGREE ENDOSCRUB II (5EA/PK)

## (undated) DEVICE — SUTURE GENERAL

## (undated) DEVICE — SOD. CHL. INJ. 0.9% 250 ML - (36/CA 50CA/PF)

## (undated) DEVICE — SUTURE 4-0 PLAIN GUT SC-1 ETHICON (36PK/BX)

## (undated) DEVICE — SUCTION INSTRUMENT YANKAUER BULBOUS TIP W/O VENT (50EA/CA)

## (undated) DEVICE — GOWN WARMING STANDARD FLEX - (30/CA)

## (undated) DEVICE — PROTECTOR ULNA NERVE - (36PR/CA)

## (undated) DEVICE — SODIUM CHL. IRRIGATION 0.9% 3000ML (4EA/CA 65CA/PF)

## (undated) DEVICE — DRAPE IOBAN II INCISE 23X17 - (10EA/BX 4BX/CA)

## (undated) DEVICE — TUBING PATIENT W/CONNECTOR REDEUCE (1EA)

## (undated) DEVICE — FINGER COT NASAL PACK, SIZE 2 (2EA/PK 50PK/BX 100EA/BX)

## (undated) DEVICE — DEVICE SUTURING PASSER SLINGSHOT LEFT 45 DEGREE

## (undated) DEVICE — KIT ANESTHESIA W/CIRCUIT & 3/LT BAG W/FILTER (20EA/CA)

## (undated) DEVICE — SENSOR OXIMETER ADULT SPO2 RD SET (20EA/BX)

## (undated) DEVICE — TUBING PUMP WITH CONNECTOR REDEUCE (1EA)

## (undated) DEVICE — HEAD HOLDER JUNIOR/ADULT

## (undated) DEVICE — NEPTUNE 4 PORT MANIFOLD - (20/PK)

## (undated) DEVICE — DRAPE SHOULDER FLUID CONTROL - 77 X 85 (10/CA)

## (undated) DEVICE — ABLATOR WAND SERFAS 90-S CRUISE

## (undated) DEVICE — NEEDLE SPINAL NON-SAFETY 25GA X 3 (25EA/BX)"

## (undated) DEVICE — GOWN SURGICAL X-LARGE ULTRA - FILM-REINFORCED (20/CA)

## (undated) DEVICE — PATTIES SURG X-RAYCOTTONOID - 1/2 X 3 IN (200/CA)

## (undated) DEVICE — SPONGE GAUZESTER. 2X2 4-PL - (2/PK 50PK/BX 30BX/CS)

## (undated) DEVICE — MASK OXYGEN VNYL ADLT MED CONC WITH 7 FOOT TUBING - (50EA/CA)

## (undated) DEVICE — TAPE XBRAID TT 2.0MM (12EA/BX)

## (undated) DEVICE — TUBE CONNECTING SUCTION - CLEAR PLASTIC STERILE 72 IN (50EA/CA)

## (undated) DEVICE — CHLORAPREP 26 ML APPLICATOR - ORANGE TINT(25/CA)

## (undated) DEVICE — SLEEVE SHOULDER DISP(ARTHREX) - (6/BX)

## (undated) DEVICE — MASK AIRWAY SIZE 3 UNIQUE SILICON (10/BX)

## (undated) DEVICE — TRACKER ENT PATIENT

## (undated) DEVICE — TUBING ENDOSCRUB II (5EA/PK)

## (undated) DEVICE — BLADE ROTATABLE STRAIGHT 13CM

## (undated) DEVICE — TUBING SET IRRIG XOMED (NEW) - (4/PK)

## (undated) DEVICE — SPECIMEN PLASTIC CONVERTOR - (10/CA)

## (undated) DEVICE — TOWEL STOP TIMEOUT SAFETY FLAG (40EA/CA)

## (undated) DEVICE — SLEEVE VASO DVT COMPRESSION CALF MED - (10PR/CA)

## (undated) DEVICE — ANTI-FOG SOLUTION - 60BTL/CA

## (undated) DEVICE — DRAPE U SPLIT IMP 54 X 76 - (24/CA)

## (undated) DEVICE — CANISTER SUCTION RIGID RED 1500CC (40EA/CA)

## (undated) DEVICE — Device

## (undated) DEVICE — TUBING CASSETTE CROSSFLOW INTEGRATED (10EA/CA)

## (undated) DEVICE — SET LEADWIRE 5 LEAD BEDSIDE DISPOSABLE ECG (1SET OF 5/EA)

## (undated) DEVICE — TRACKER ENT INSTRUMENT

## (undated) DEVICE — KIT  I.V. START (100EA/CA)

## (undated) DEVICE — SUTURE 3-0 ETHILON FS-1 - (36/BX) 30 INCH

## (undated) DEVICE — GLOVE BIOGEL INDICATOR SZ 7.5 SURGICAL PF LTX - (50PR/BX 4BX/CA)

## (undated) DEVICE — TUBING CLEARLINK DUO-VENT - C-FLO (48EA/CA)

## (undated) DEVICE — GLOVE BIOGEL SZ 7 SURGICAL PF LTX - (50PR/BX 4BX/CA)

## (undated) DEVICE — CANNULA FULLY THREADED 8 X 75 (5EA/BX)

## (undated) DEVICE — CANISTER SUCTION 3000ML MECHANICAL FILTER AUTO SHUTOFF MEDI-VAC NONSTERILE LF DISP (40EA/CA)

## (undated) DEVICE — PACK ENT OR - (2EA/CA)

## (undated) DEVICE — TUBE E-T HI-LO CUFF 7.0MM (10EA/PK)

## (undated) DEVICE — SYRINGE 50 ML LL (40EA/BX 4BX/CA)